# Patient Record
Sex: FEMALE | Race: WHITE | NOT HISPANIC OR LATINO | URBAN - METROPOLITAN AREA
[De-identification: names, ages, dates, MRNs, and addresses within clinical notes are randomized per-mention and may not be internally consistent; named-entity substitution may affect disease eponyms.]

---

## 2023-11-12 ENCOUNTER — INPATIENT (INPATIENT)
Facility: HOSPITAL | Age: 78
LOS: 2 days | Discharge: HOME | DRG: 516 | End: 2023-11-15
Attending: INTERNAL MEDICINE | Admitting: INTERNAL MEDICINE
Payer: MEDICARE

## 2023-11-12 VITALS
OXYGEN SATURATION: 95 % | DIASTOLIC BLOOD PRESSURE: 75 MMHG | WEIGHT: 175.05 LBS | HEART RATE: 87 BPM | SYSTOLIC BLOOD PRESSURE: 111 MMHG | RESPIRATION RATE: 18 BRPM | HEIGHT: 61 IN | TEMPERATURE: 98 F

## 2023-11-12 DIAGNOSIS — E11.9 TYPE 2 DIABETES MELLITUS WITHOUT COMPLICATIONS: ICD-10-CM

## 2023-11-12 DIAGNOSIS — I10 ESSENTIAL (PRIMARY) HYPERTENSION: ICD-10-CM

## 2023-11-12 DIAGNOSIS — I48.91 UNSPECIFIED ATRIAL FIBRILLATION: ICD-10-CM

## 2023-11-12 DIAGNOSIS — M31.6 OTHER GIANT CELL ARTERITIS: ICD-10-CM

## 2023-11-12 DIAGNOSIS — H53.9 UNSPECIFIED VISUAL DISTURBANCE: ICD-10-CM

## 2023-11-12 LAB
ALBUMIN SERPL ELPH-MCNC: 3.5 G/DL — SIGNIFICANT CHANGE UP (ref 3.3–5)
ALBUMIN SERPL ELPH-MCNC: 3.5 G/DL — SIGNIFICANT CHANGE UP (ref 3.3–5)
ALP SERPL-CCNC: 96 U/L — SIGNIFICANT CHANGE UP (ref 40–120)
ALP SERPL-CCNC: 96 U/L — SIGNIFICANT CHANGE UP (ref 40–120)
ALT FLD-CCNC: 11 U/L — SIGNIFICANT CHANGE UP (ref 10–45)
ALT FLD-CCNC: 11 U/L — SIGNIFICANT CHANGE UP (ref 10–45)
ANION GAP SERPL CALC-SCNC: 11 MMOL/L — SIGNIFICANT CHANGE UP (ref 5–17)
ANION GAP SERPL CALC-SCNC: 11 MMOL/L — SIGNIFICANT CHANGE UP (ref 5–17)
APTT BLD: 30.3 SEC — SIGNIFICANT CHANGE UP (ref 24.5–35.6)
APTT BLD: 30.3 SEC — SIGNIFICANT CHANGE UP (ref 24.5–35.6)
AST SERPL-CCNC: 12 U/L — SIGNIFICANT CHANGE UP (ref 10–40)
AST SERPL-CCNC: 12 U/L — SIGNIFICANT CHANGE UP (ref 10–40)
BILIRUB SERPL-MCNC: 0.2 MG/DL — SIGNIFICANT CHANGE UP (ref 0.2–1.2)
BILIRUB SERPL-MCNC: 0.2 MG/DL — SIGNIFICANT CHANGE UP (ref 0.2–1.2)
BUN SERPL-MCNC: 19 MG/DL — SIGNIFICANT CHANGE UP (ref 7–23)
BUN SERPL-MCNC: 19 MG/DL — SIGNIFICANT CHANGE UP (ref 7–23)
CALCIUM SERPL-MCNC: 9 MG/DL — SIGNIFICANT CHANGE UP (ref 8.4–10.5)
CALCIUM SERPL-MCNC: 9 MG/DL — SIGNIFICANT CHANGE UP (ref 8.4–10.5)
CHLORIDE SERPL-SCNC: 104 MMOL/L — SIGNIFICANT CHANGE UP (ref 96–108)
CHLORIDE SERPL-SCNC: 104 MMOL/L — SIGNIFICANT CHANGE UP (ref 96–108)
CO2 SERPL-SCNC: 24 MMOL/L — SIGNIFICANT CHANGE UP (ref 22–31)
CO2 SERPL-SCNC: 24 MMOL/L — SIGNIFICANT CHANGE UP (ref 22–31)
CREAT SERPL-MCNC: 0.74 MG/DL — SIGNIFICANT CHANGE UP (ref 0.5–1.3)
CREAT SERPL-MCNC: 0.74 MG/DL — SIGNIFICANT CHANGE UP (ref 0.5–1.3)
CRP SERPL-MCNC: 23 MG/L — HIGH (ref 0–4)
CRP SERPL-MCNC: 23 MG/L — HIGH (ref 0–4)
EGFR: 84 ML/MIN/1.73M2 — SIGNIFICANT CHANGE UP
EGFR: 84 ML/MIN/1.73M2 — SIGNIFICANT CHANGE UP
ERYTHROCYTE [SEDIMENTATION RATE] IN BLOOD: 76 MM/HR — HIGH (ref 0–20)
ERYTHROCYTE [SEDIMENTATION RATE] IN BLOOD: 76 MM/HR — HIGH (ref 0–20)
GLUCOSE BLDC GLUCOMTR-MCNC: 317 MG/DL — HIGH (ref 70–99)
GLUCOSE BLDC GLUCOMTR-MCNC: 317 MG/DL — HIGH (ref 70–99)
GLUCOSE BLDC GLUCOMTR-MCNC: 427 MG/DL — HIGH (ref 70–99)
GLUCOSE BLDC GLUCOMTR-MCNC: 427 MG/DL — HIGH (ref 70–99)
GLUCOSE BLDC GLUCOMTR-MCNC: 445 MG/DL — HIGH (ref 70–99)
GLUCOSE BLDC GLUCOMTR-MCNC: 445 MG/DL — HIGH (ref 70–99)
GLUCOSE BLDC GLUCOMTR-MCNC: 458 MG/DL — CRITICAL HIGH (ref 70–99)
GLUCOSE BLDC GLUCOMTR-MCNC: 458 MG/DL — CRITICAL HIGH (ref 70–99)
GLUCOSE SERPL-MCNC: 312 MG/DL — HIGH (ref 70–99)
GLUCOSE SERPL-MCNC: 312 MG/DL — HIGH (ref 70–99)
HCT VFR BLD CALC: 37 % — SIGNIFICANT CHANGE UP (ref 34.5–45)
HCT VFR BLD CALC: 37 % — SIGNIFICANT CHANGE UP (ref 34.5–45)
HGB BLD-MCNC: 12.1 G/DL — SIGNIFICANT CHANGE UP (ref 11.5–15.5)
HGB BLD-MCNC: 12.1 G/DL — SIGNIFICANT CHANGE UP (ref 11.5–15.5)
INR BLD: 1.19 RATIO — HIGH (ref 0.85–1.18)
INR BLD: 1.19 RATIO — HIGH (ref 0.85–1.18)
MCHC RBC-ENTMCNC: 28.1 PG — SIGNIFICANT CHANGE UP (ref 27–34)
MCHC RBC-ENTMCNC: 28.1 PG — SIGNIFICANT CHANGE UP (ref 27–34)
MCHC RBC-ENTMCNC: 32.7 GM/DL — SIGNIFICANT CHANGE UP (ref 32–36)
MCHC RBC-ENTMCNC: 32.7 GM/DL — SIGNIFICANT CHANGE UP (ref 32–36)
MCV RBC AUTO: 86 FL — SIGNIFICANT CHANGE UP (ref 80–100)
MCV RBC AUTO: 86 FL — SIGNIFICANT CHANGE UP (ref 80–100)
NRBC # BLD: 0 /100 WBCS — SIGNIFICANT CHANGE UP (ref 0–0)
NRBC # BLD: 0 /100 WBCS — SIGNIFICANT CHANGE UP (ref 0–0)
PLATELET # BLD AUTO: 309 K/UL — SIGNIFICANT CHANGE UP (ref 150–400)
PLATELET # BLD AUTO: 309 K/UL — SIGNIFICANT CHANGE UP (ref 150–400)
POTASSIUM SERPL-MCNC: 4.8 MMOL/L — SIGNIFICANT CHANGE UP (ref 3.5–5.3)
POTASSIUM SERPL-MCNC: 4.8 MMOL/L — SIGNIFICANT CHANGE UP (ref 3.5–5.3)
POTASSIUM SERPL-SCNC: 4.8 MMOL/L — SIGNIFICANT CHANGE UP (ref 3.5–5.3)
POTASSIUM SERPL-SCNC: 4.8 MMOL/L — SIGNIFICANT CHANGE UP (ref 3.5–5.3)
PROT SERPL-MCNC: 6.9 G/DL — SIGNIFICANT CHANGE UP (ref 6–8.3)
PROT SERPL-MCNC: 6.9 G/DL — SIGNIFICANT CHANGE UP (ref 6–8.3)
PROTHROM AB SERPL-ACNC: 13 SEC — SIGNIFICANT CHANGE UP (ref 9.5–13)
PROTHROM AB SERPL-ACNC: 13 SEC — SIGNIFICANT CHANGE UP (ref 9.5–13)
RBC # BLD: 4.3 M/UL — SIGNIFICANT CHANGE UP (ref 3.8–5.2)
RBC # BLD: 4.3 M/UL — SIGNIFICANT CHANGE UP (ref 3.8–5.2)
RBC # FLD: 12.6 % — SIGNIFICANT CHANGE UP (ref 10.3–14.5)
RBC # FLD: 12.6 % — SIGNIFICANT CHANGE UP (ref 10.3–14.5)
SODIUM SERPL-SCNC: 139 MMOL/L — SIGNIFICANT CHANGE UP (ref 135–145)
SODIUM SERPL-SCNC: 139 MMOL/L — SIGNIFICANT CHANGE UP (ref 135–145)
WBC # BLD: 7.97 K/UL — SIGNIFICANT CHANGE UP (ref 3.8–10.5)
WBC # BLD: 7.97 K/UL — SIGNIFICANT CHANGE UP (ref 3.8–10.5)
WBC # FLD AUTO: 7.97 K/UL — SIGNIFICANT CHANGE UP (ref 3.8–10.5)
WBC # FLD AUTO: 7.97 K/UL — SIGNIFICANT CHANGE UP (ref 3.8–10.5)

## 2023-11-12 PROCEDURE — 99223 1ST HOSP IP/OBS HIGH 75: CPT

## 2023-11-12 PROCEDURE — 99223 1ST HOSP IP/OBS HIGH 75: CPT | Mod: GC

## 2023-11-12 PROCEDURE — 70496 CT ANGIOGRAPHY HEAD: CPT | Mod: 26,MA

## 2023-11-12 PROCEDURE — 99285 EMERGENCY DEPT VISIT HI MDM: CPT | Mod: GC

## 2023-11-12 PROCEDURE — 99221 1ST HOSP IP/OBS SF/LOW 40: CPT

## 2023-11-12 PROCEDURE — 70543 MRI ORBT/FAC/NCK W/O &W/DYE: CPT | Mod: 26

## 2023-11-12 PROCEDURE — 70553 MRI BRAIN STEM W/O & W/DYE: CPT | Mod: 26

## 2023-11-12 PROCEDURE — 70498 CT ANGIOGRAPHY NECK: CPT | Mod: 26,MA

## 2023-11-12 RX ORDER — INFLUENZA VIRUS VACCINE 15; 15; 15; 15 UG/.5ML; UG/.5ML; UG/.5ML; UG/.5ML
0.7 SUSPENSION INTRAMUSCULAR ONCE
Refills: 0 | Status: DISCONTINUED | OUTPATIENT
Start: 2023-11-12 | End: 2023-11-15

## 2023-11-12 RX ORDER — RAMIPRIL 5 MG
1 CAPSULE ORAL
Refills: 0 | DISCHARGE

## 2023-11-12 RX ORDER — PANTOPRAZOLE SODIUM 20 MG/1
40 TABLET, DELAYED RELEASE ORAL
Refills: 0 | Status: DISCONTINUED | OUTPATIENT
Start: 2023-11-12 | End: 2023-11-14

## 2023-11-12 RX ORDER — LEVOTHYROXINE SODIUM 125 MCG
1 TABLET ORAL
Refills: 0 | DISCHARGE

## 2023-11-12 RX ORDER — SODIUM CHLORIDE 9 MG/ML
1000 INJECTION, SOLUTION INTRAVENOUS
Refills: 0 | Status: DISCONTINUED | OUTPATIENT
Start: 2023-11-12 | End: 2023-11-14

## 2023-11-12 RX ORDER — ENOXAPARIN SODIUM 100 MG/ML
40 INJECTION SUBCUTANEOUS EVERY 24 HOURS
Refills: 0 | Status: DISCONTINUED | OUTPATIENT
Start: 2023-11-12 | End: 2023-11-12

## 2023-11-12 RX ORDER — DEXTROSE 50 % IN WATER 50 %
12.5 SYRINGE (ML) INTRAVENOUS ONCE
Refills: 0 | Status: DISCONTINUED | OUTPATIENT
Start: 2023-11-12 | End: 2023-11-14

## 2023-11-12 RX ORDER — METOPROLOL TARTRATE 50 MG
1 TABLET ORAL
Refills: 0 | DISCHARGE

## 2023-11-12 RX ORDER — INSULIN LISPRO 100/ML
8 VIAL (ML) SUBCUTANEOUS
Refills: 0 | Status: DISCONTINUED | OUTPATIENT
Start: 2023-11-12 | End: 2023-11-14

## 2023-11-12 RX ORDER — METOPROLOL TARTRATE 50 MG
100 TABLET ORAL
Refills: 0 | Status: DISCONTINUED | OUTPATIENT
Start: 2023-11-12 | End: 2023-11-14

## 2023-11-12 RX ORDER — GLUCAGON INJECTION, SOLUTION 0.5 MG/.1ML
1 INJECTION, SOLUTION SUBCUTANEOUS ONCE
Refills: 0 | Status: DISCONTINUED | OUTPATIENT
Start: 2023-11-12 | End: 2023-11-14

## 2023-11-12 RX ORDER — APIXABAN 2.5 MG/1
1 TABLET, FILM COATED ORAL
Refills: 0 | DISCHARGE

## 2023-11-12 RX ORDER — FERROUS SULFATE 325(65) MG
1 TABLET ORAL
Refills: 0 | DISCHARGE

## 2023-11-12 RX ORDER — INSULIN LISPRO 100/ML
VIAL (ML) SUBCUTANEOUS
Refills: 0 | Status: DISCONTINUED | OUTPATIENT
Start: 2023-11-12 | End: 2023-11-14

## 2023-11-12 RX ORDER — LISINOPRIL 2.5 MG/1
20 TABLET ORAL DAILY
Refills: 0 | Status: DISCONTINUED | OUTPATIENT
Start: 2023-11-12 | End: 2023-11-14

## 2023-11-12 RX ORDER — INSULIN GLARGINE 100 [IU]/ML
15 INJECTION, SOLUTION SUBCUTANEOUS AT BEDTIME
Refills: 0 | Status: DISCONTINUED | OUTPATIENT
Start: 2023-11-12 | End: 2023-11-12

## 2023-11-12 RX ORDER — DEXTROSE 50 % IN WATER 50 %
25 SYRINGE (ML) INTRAVENOUS ONCE
Refills: 0 | Status: DISCONTINUED | OUTPATIENT
Start: 2023-11-12 | End: 2023-11-14

## 2023-11-12 RX ORDER — INSULIN GLARGINE 100 [IU]/ML
25 INJECTION, SOLUTION SUBCUTANEOUS AT BEDTIME
Refills: 0 | Status: DISCONTINUED | OUTPATIENT
Start: 2023-11-12 | End: 2023-11-14

## 2023-11-12 RX ORDER — ENOXAPARIN SODIUM 100 MG/ML
80 INJECTION SUBCUTANEOUS
Refills: 0 | Status: DISCONTINUED | OUTPATIENT
Start: 2023-11-12 | End: 2023-11-12

## 2023-11-12 RX ORDER — DEXTROSE 50 % IN WATER 50 %
15 SYRINGE (ML) INTRAVENOUS ONCE
Refills: 0 | Status: DISCONTINUED | OUTPATIENT
Start: 2023-11-12 | End: 2023-11-14

## 2023-11-12 RX ORDER — LEVOTHYROXINE SODIUM 125 MCG
100 TABLET ORAL DAILY
Refills: 0 | Status: DISCONTINUED | OUTPATIENT
Start: 2023-11-12 | End: 2023-11-14

## 2023-11-12 RX ORDER — ATORVASTATIN CALCIUM 80 MG/1
1 TABLET, FILM COATED ORAL
Refills: 0 | DISCHARGE

## 2023-11-12 RX ORDER — ATORVASTATIN CALCIUM 80 MG/1
10 TABLET, FILM COATED ORAL AT BEDTIME
Refills: 0 | Status: DISCONTINUED | OUTPATIENT
Start: 2023-11-12 | End: 2023-11-14

## 2023-11-12 RX ORDER — INSULIN LISPRO 100/ML
VIAL (ML) SUBCUTANEOUS AT BEDTIME
Refills: 0 | Status: DISCONTINUED | OUTPATIENT
Start: 2023-11-12 | End: 2023-11-14

## 2023-11-12 RX ORDER — INSULIN LISPRO 100/ML
5 VIAL (ML) SUBCUTANEOUS
Refills: 0 | Status: DISCONTINUED | OUTPATIENT
Start: 2023-11-12 | End: 2023-11-12

## 2023-11-12 RX ORDER — (INSULIN DEGLUDEC AND LIRAGLUTIDE) 100; 3.6 [IU]/ML; MG/ML
14 INJECTION, SOLUTION SUBCUTANEOUS
Refills: 0 | DISCHARGE

## 2023-11-12 RX ADMIN — Medication 2: at 22:47

## 2023-11-12 RX ADMIN — ATORVASTATIN CALCIUM 10 MILLIGRAM(S): 80 TABLET, FILM COATED ORAL at 21:19

## 2023-11-12 RX ADMIN — Medication 8 UNIT(S): at 18:21

## 2023-11-12 RX ADMIN — PANTOPRAZOLE SODIUM 40 MILLIGRAM(S): 20 TABLET, DELAYED RELEASE ORAL at 16:58

## 2023-11-12 RX ADMIN — Medication 1000 MILLIGRAM(S): at 08:01

## 2023-11-12 RX ADMIN — LISINOPRIL 20 MILLIGRAM(S): 2.5 TABLET ORAL at 18:18

## 2023-11-12 RX ADMIN — ENOXAPARIN SODIUM 40 MILLIGRAM(S): 100 INJECTION SUBCUTANEOUS at 16:58

## 2023-11-12 RX ADMIN — INSULIN GLARGINE 25 UNIT(S): 100 INJECTION, SOLUTION SUBCUTANEOUS at 22:47

## 2023-11-12 RX ADMIN — Medication 100 MILLIGRAM(S): at 18:18

## 2023-11-12 RX ADMIN — Medication 6: at 18:18

## 2023-11-12 RX ADMIN — Medication 50 MILLIGRAM(S): at 07:01

## 2023-11-12 RX ADMIN — Medication 100 MICROGRAM(S): at 18:18

## 2023-11-12 NOTE — H&P ADULT - ASSESSMENT
79yo woman with a PMHx of T2DM, HTN, HLD, hypothyroidism, AFib and bilateral cataract surgery presents to [place] ER after one episode of transient right eye vision loss, 3 weeks of episodic headaches, found to have elevated ESR and CRP, transferred to [facility] to be seen by optho and neuro, given 1 gm IV solumedrol in ED due to concern for GCA, admitted for further management and rheum eval.    AMAUROSIS FUGAX  HTN  HLD  R/O GCA  Afib  Hyperglycemia in a setting of IV steroids and known DM  Transient vision loss of right eye, appearing to have a curtain like grey haze lasting about 10-15 minutes, then spontaneously resolved per patient. There is a  concern for amaurosis fugax  possibly 2/2 GCA as patient found to have elevated ESR/CRP, endorsed temporal tenderness, headaches, and jaw pain. Will also need to evaluate for potential vascular etiology. Less likely 2/2 intraorbital pathology. Patient denies any visual deficit, headache, or temporal/ scalp tenderness at present.      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 R superficial TA  seem by be inflamed as well  - s/p IV 1g solumedrol given in ED. Plan for at least 3 days of pulse dose steroids  -  rheumatology and vascular called  - place on insulin on a SS, Lantus and premeal admelog. Endo called  - DVT ppx w sc Lovenox. GI ppx w PPI         77yo woman with a PMHx of T2DM, HTN, HLD, hypothyroidism, AFib on ELiquis, last dose in am of 11/11  and bilateral cataract surgery presents to [place] ER after one episode of transient right eye vision loss, 3 weeks of episodic headaches, found to have elevated ESR and CRP, transferred to [facility] to be seen by optho and neuro, given 1 gm IV solumedrol in ED due to concern for GCA, admitted for further management and rheum eval.    AMAUROSIS FUGAX  HTN  HLD  R/O GCA  Afib  Hyperglycemia in a setting of IV steroids and known DM  Transient vision loss of right eye, appearing to have a curtain like grey haze lasting about 10-15 minutes, then spontaneously resolved per patient. There is a  concern for amaurosis fugax  possibly 2/2 GCA as patient found to have elevated ESR/CRP, endorsed temporal tenderness, headaches, and jaw pain. Will also need to evaluate for potential vascular etiology. Less likely 2/2 intraorbital pathology. Patient denies any visual deficit, headache, or temporal/ scalp tenderness at present.      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 R superficial TA  seem by be inflamed as well  - s/p IV 1g solumedrol given in ED. Plan for at least 3 days of pulse dose steroids  -  rheumatology and vascular called  - place on insulin on a SS, Lantus and premeal admelog. Endo called  - Afib. change AC to Lovenox 80 mg q12H in prep for TA Biopsy  - DVT ppx not necessary. ptn is on full AC. GI ppx w PPI

## 2023-11-12 NOTE — CONSULT NOTE ADULT - SUBJECTIVE AND OBJECTIVE BOX
HPI:  79yo woman with a PMHx of T2DM, HTN, HLD, hypothyroidism, AFib on ELiquis, last dose in am of 11/11 and bilateral cataract surgery presents with 1 episode of transient vision loss and 3 weeks of episodic headaches.   Pt reported on 11/11 at around 2PM to [facility] ER after she noticed her right eye vision was decreased as if a gray film was over her vision for 10-15 minutes.   Denied pain, flashes, curtain over vision or diplopia when assessed by Ophthalmology. Reports this episode resolved without intervention and that vision is now back to baseline. Denies head or eye trauma. Denies prior similar episodes. Also reports she woke up 11/11 with lightheadedness and fatigue. Reports chronic floaters that are unchanged. Pt also reports she has had 3 weeks of episodic headaches that are generalized however with prominence in bilateral temporal areas, reports that she feels whole scalp tenderness when the headache occurs, also has tenderness when palpating bilateral temporal areas. She denied jaw claudication, fevers, chills, or significant joint pains.   At  [facility] ESR was found to be 50, pt received 60mg IV solumedrol. CTH noncon was wnl. Pt then transferred to [facility] for ophthalmology evaluation. on 11/12 received 1g IV solumedrol. CRP 23.  Patient reports that her vision is at baseline now, denies any visual deficit, headache, or temporal/ scalp tenderness at present.  This morning patient reports that her symptoms may be dental related and does endorse jaw pain. Denies any joint pain.   Seen by Neuro and optho in the ED, rheum called (12 Nov 2023 15:10)  Patient has history of diabetes, denies any polyuria polydipsia, no recent hypoglycemic episodes. Patient follows up with PCP for health diabetes management.    PAST MEDICAL & SURGICAL HISTORY:  Hypertension      Hyperlipidemia      Atrial fibrillation      Diabetes      No significant past surgical history          FAMILY HISTORY:  No pertinent family history in first degree relatives        Social History:    Outpatient Medications:    MEDICATIONS  (STANDING):  atorvastatin 10 milliGRAM(s) Oral at bedtime  dextrose 5%. 1000 milliLiter(s) (100 mL/Hr) IV Continuous <Continuous>  dextrose 5%. 1000 milliLiter(s) (50 mL/Hr) IV Continuous <Continuous>  dextrose 50% Injectable 25 Gram(s) IV Push once  dextrose 50% Injectable 12.5 Gram(s) IV Push once  dextrose 50% Injectable 25 Gram(s) IV Push once  glucagon  Injectable 1 milliGRAM(s) IntraMuscular once  insulin glargine Injectable (LANTUS) 25 Unit(s) SubCutaneous at bedtime  insulin lispro (ADMELOG) corrective regimen sliding scale   SubCutaneous three times a day before meals  insulin lispro Injectable (ADMELOG) 8 Unit(s) SubCutaneous three times a day before meals  levothyroxine 100 MICROGram(s) Oral daily  lisinopril 20 milliGRAM(s) Oral daily  metoprolol tartrate 100 milliGRAM(s) Oral two times a day  pantoprazole    Tablet 40 milliGRAM(s) Oral before breakfast    MEDICATIONS  (PRN):  dextrose Oral Gel 15 Gram(s) Oral once PRN Blood Glucose LESS THAN 70 milliGRAM(s)/deciliter      Allergies    No Known Allergies    Intolerances      Review of Systems:  UNABLE TO OBTAIN  Cardiovascular: No chest pain, no palpitations  Respiratory: No wheezing, no cough  GI: No nausea, no vomiting  : No dysuria        ALL OTHER SYSTEMS REVIEWED AND NEGATIVE    PHYSICAL EXAM:  VITALS: T(C): 36.8 (11-12-23 @ 14:52)  T(F): 98.2 (11-12-23 @ 14:52), Max: 98.4 (11-12-23 @ 11:08)  HR: 80 (11-12-23 @ 14:52) (80 - 88)  BP: 109/69 (11-12-23 @ 14:52) (109/69 - 123/73)  RR:  (16 - 18)  SpO2:  (95% - 98%)  Wt(kg): --  THYROID: Normal size, no palpable nodules  RESPIRATORY: Clear to auscultation bilaterally.  CARDIOVASCULAR: Si S2, No murmurs;  GI: Soft, non distended.      POCT Blood Glucose.: 427 mg/dL (11-12-23 @ 18:16)                            12.1   7.97  )-----------( 309      ( 12 Nov 2023 06:26 )             37.0       11-12    139  |  104  |  19  ----------------------------<  312<H>  4.8   |  24  |  0.74    eGFR: 84    Ca    9.0      11-12    TPro  6.9  /  Alb  3.5  /  TBili  0.2  /  DBili  x   /  AST  12  /  ALT  11  /  AlkPhos  96  11-12      Thyroid Function Tests:          Radiology:

## 2023-11-12 NOTE — ED PROVIDER NOTE - PROGRESS NOTE DETAILS
Tyron CORONA, PGY-1;    Optho consulted, informed patient is in ED and ready for evaluation. Tyron CORONA, PGY-1;    Esmeho recommends neurology consult and stroke workup. Will order and f/u CTA head/neck. Additionally, repeating CRP/ESR/CBC. Dispo to medicine following imagining for further rheum workup and need for potential temporal artery biopsy.    Neurology consulted, will see patient in ED. Vaishali, PGY3: Patient signed out to my care. pending CTA and neurology consult.  Will most likely admit.

## 2023-11-12 NOTE — ED ADULT NURSE REASSESSMENT NOTE - NS ED NURSE REASSESS COMMENT FT1
Solumedrol IVPB not available in [facility] [place] [facility]. Spoke with pharmacist who states medication will be sent via tube. Awaiting medication arrival.

## 2023-11-12 NOTE — CONSULT NOTE ADULT - SUBJECTIVE AND OBJECTIVE BOX
VASCULAR SURGERY CONSULT NOTE  --------------------------------------------------------------------------------------------    Patient is a 78y old  Female who presents with a chief complaint of headache    HPI:  79yo woman with a PMHx of T2DM, HTN, HLD, hypothyroidism, AFib on ELiquis, last dose in am of 11/11 and bilateral cataract surgery presents with 1 episode of transient vision loss and 3 weeks of episodic headaches.   Pt reported on 11/11 at around 2PM to [facility] ER after she noticed her right eye vision was decreased as if a gray film was over her vision for 10-15 minutes.   Denied pain, flashes, curtain over vision or diplopia when assessed by Ophthalmology. Reports this episode resolved without intervention and that vision is now back to baseline. Denies head or eye trauma. Denies prior similar episodes. Also reports she woke up 11/11 with lightheadedness and fatigue. Reports chronic floaters that are unchanged. Pt also reports she has had 3 weeks of episodic headaches that are generalized however with prominence in bilateral temporal areas, reports that she feels whole scalp tenderness when the headache occurs, also has tenderness when palpating bilateral temporal areas. She denied jaw claudication, fevers, chills, or significant joint pains.     At  [facility] ESR was found to be 50, pt received 60mg IV solumedrol. CTH noncon was wnl. Pt then transferred to [facility] for ophthalmology evaluation. on 11/12 received 1g IV solumedrol. CRP 23.  Patient reports that her vision is at baseline now, denies any visual deficit, headache, or temporal/ scalp tenderness at present.  This morning patient reports that her symptoms may be dental related and does endorse jaw pain. Denies any joint pain.   Seen by Neuro and optho in the ED, rheum called (12 Nov 2023 15:10)      ROS: 10-system review is otherwise negative except HPI above.      PAST MEDICAL & SURGICAL HISTORY:  Hypertension      Hyperlipidemia      Atrial fibrillation      Diabetes      No significant past surgical history        FAMILY HISTORY:  No pertinent family history in first degree relatives        SOCIAL HISTORY:      ALLERGIES: No Known Allergies      HOME MEDICATIONS:     CURRENT MEDICATIONS  MEDICATIONS (STANDING): atorvastatin 10 milliGRAM(s) Oral at bedtime  dextrose 5%. 1000 milliLiter(s) IV Continuous <Continuous>  dextrose 5%. 1000 milliLiter(s) IV Continuous <Continuous>  dextrose 50% Injectable 25 Gram(s) IV Push once  dextrose 50% Injectable 12.5 Gram(s) IV Push once  dextrose 50% Injectable 25 Gram(s) IV Push once  enoxaparin Injectable 80 milliGRAM(s) SubCutaneous <User Schedule>  glucagon  Injectable 1 milliGRAM(s) IntraMuscular once  insulin glargine Injectable (LANTUS) 25 Unit(s) SubCutaneous at bedtime  insulin lispro (ADMELOG) corrective regimen sliding scale   SubCutaneous three times a day before meals  insulin lispro Injectable (ADMELOG) 8 Unit(s) SubCutaneous three times a day before meals  levothyroxine 100 MICROGram(s) Oral daily  lisinopril 20 milliGRAM(s) Oral daily  metoprolol tartrate 100 milliGRAM(s) Oral two times a day  pantoprazole    Tablet 40 milliGRAM(s) Oral before breakfast    MEDICATIONS (PRN):dextrose Oral Gel 15 Gram(s) Oral once PRN Blood Glucose LESS THAN 70 milliGRAM(s)/deciliter    --------------------------------------------------------------------------------------------    Vitals:   T(C): 36.8 (11-12-23 @ 14:52), Max: 36.9 (11-12-23 @ 11:08)  HR: 80 (11-12-23 @ 14:52) (80 - 88)  BP: 109/69 (11-12-23 @ 14:52) (109/69 - 123/73)  RR: 16 (11-12-23 @ 14:52) (16 - 18)  SpO2: 98% (11-12-23 @ 14:52) (95% - 98%)  CAPILLARY BLOOD GLUCOSE        CAPILLARY BLOOD GLUCOSE          Height (cm): 154.9 (11-12 @ 03:52)  Weight (kg): 79.4 (11-12 @ 03:52)  BMI (kg/m2): 33.1 (11-12 @ 03:52)  BSA (m2): 1.78 (11-12 @ 03:52)    PHYSICAL EXAM:   General: NAD, Lying in bed comfortably  Neuro: A+Ox3  HEENT: NC/AT, EOMI  Neck: Soft, supple  Cardio: RRR, nml S1/S2  Resp: Good effort, CTA b/l  GI/Abd: Soft, NT/ND, no rebound/guarding, no masses palpated  Vascular:   Skin: Intact, no breakdown  Lymphatic/Nodes: No palpable lymphadenopathy  Musculoskeletal: All 4 extremities moving spontaneously, no limitations.  --------------------------------------------------------------------------------------------    LABS  CBC (11-12 @ 06:26)                              12.1                           7.97    )----------------(  309        --    % Neutrophils, --    % Lymphocytes, ANC: --                                  37.0      BMP (11-12 @ 06:26)             139     |  104     |  19    		Ca++ --      Ca 9.0                ---------------------------------( 312<H>		Mg --                 4.8     |  24      |  0.74  			Ph --        LFTs (11-12 @ 06:26)      TPro 6.9 / Alb 3.5 / TBili 0.2 / DBili -- / AST 12 / ALT 11 / AlkPhos 96    Coags (11-12 @ 06:33)  aPTT 30.3 / INR 1.19<H> / PT 13.0          --------------------------------------------------------------------------------------------    MICROBIOLOGY  Urinalysis (11-12 @ 06:26):     Color:  / Appearance:  / SG:  / pH:  / Gluc: 312<H> / Ketones:  / Bili:  / Urobili:  / Protein : / Nitrites:  / Leuk.Est:  / RBC:  / WBC:  / Sq Epi:  / Non Sq Epi:  / Bacteria          --------------------------------------------------------------------------------------------    IMAGING

## 2023-11-12 NOTE — ED ADULT NURSE NOTE - OBJECTIVE STATEMENT
PT is a 78 year old A&OX4 female with PMH of DM (PT states she takes other medication but cannot recall the names or what for and transfer documentation does not say). PT arrives to the ED via EMS as a transfer from [facility] with c/o vision loss. Per EMS, PT began experiencing intermittent right-sided head pain and experienced 1 episode of vision loss that lasted ~30 minutes and has resolved. PT was transferred to [facility] for opthalmology consult. PT received 60 mg Solumedrol and 1L of normal saline PTA. PT currently denies all complaints, PT denies pain, chest pain, SOB, N/V/D, dizziness, and problems with her vision. PT is resting comfortably in bed, breathing unlabored on room air, and speaking in complete sentences. Abdomen is soft, non-tender, and non-distended. Skin is warm and dry, no diaphoresis noted. No edema noted to B/L extremities. Strong strength in B/L extremities, sensation intact. IV access established 22G in right wrist by [place]. PT arrived in hospital gown. Safety and comfort maintained.

## 2023-11-12 NOTE — ED PROVIDER NOTE - PHYSICAL EXAMINATION
Physical Exam:  Gen: NAD, AOx3, non-toxic appearing, able to ambulate without assistance  Head: NCAT  HEENT: EOMI, PEERLA, normal conjunctiva, tongue midline, oral mucosa moist  Lung: CTAB, no respiratory distress, no wheezes/rhonchi/rales B/L, speaking in full sentences  CV: RRR, no murmurs, rubs or gallops  Abd: soft, NT, ND, no guarding, no rigidity, no rebound tenderness, no CVA tenderness   MSK: no visible deformities, ROM normal in UE/LE, no back pain  Neuro: No focal sensory or motor deficits, CN II-XII intact   Skin: Warm, well perfused, no rash, no leg swelling  Psych: normal affect, calm

## 2023-11-12 NOTE — H&P ADULT - NSHPPHYSICALEXAM_GEN_ALL_CORE
T(C): 36.8 (11-12-23 @ 14:52), Max: 36.9 (11-12-23 @ 11:08)  HR: 80 (11-12-23 @ 14:52) (80 - 88)  BP: 109/69 (11-12-23 @ 14:52) (109/69 - 123/73)  RR: 16 (11-12-23 @ 14:52) (16 - 18)  SpO2: 98% (11-12-23 @ 14:52) (95% - 98%)    PHYSICAL EXAM:  GENERAL: NAD, well-developed  HEAD:  Atraumatic, Normocephalic  EYES: EOMI, PERRLA, conjunctiva and sclera clear  NECK: Supple, No JVD  CHEST/LUNG: Clear to auscultation bilaterally; No wheeze  HEART: Regular rate and rhythm; No murmurs, rubs, or gallops  ABDOMEN: Soft, Nontender, Nondistended; Bowel sounds present  EXTREMITIES:  2+ Peripheral Pulses, No clubbing, cyanosis, or edema  PSYCH: AAOx3  NEUROLOGY: non-focal  SKIN: No rashes or lesions

## 2023-11-12 NOTE — ED PROVIDER NOTE - OBJECTIVE STATEMENT
78-year-old female past medical history A-fib on eiliquis, hypertension, hyperlipidemia, diabetes presents as transfer from [facility] for ophthalmology evaluation.  Patient complains of headache right temporal region for the last 3 weeks as well as dizziness.  She had a brief episode of a 30-minute partial vision loss on the right side, lasted between 130 to 2 PM.  Patient was evaluated in the ED 5 days ago CT scan performed which did not show signs of intracranial bleed at that time.  She describes today's vision loss is occurred in coming out from the right side of her eye.  On arrival to [facility] patient does not complain of vision loss or headache.  She denies chest pain and shortness of breath.  Labs at [place] indicate ESR elevated.  She was given IV steroids and transferred to [facility]. 78-year-old female past medical history A-fib on eiliquis, hypertension, hyperlipidemia, diabetes presents as transfer from [facility] for ophthalmology evaluation.  Patient complains of headache right temporal region for the last 3 weeks as well as dizziness.  She had a brief episode of a 30-minute partial vision loss on the right side, lasted between 130 to 2 PM.  Patient was evaluated in the ED 5 days ago CT scan performed which did not show signs of intracranial bleed at that time.  She describes today's vision loss as grey film covering her right eye.  On arrival to [facility] patient does not complain of vision loss or headache.  She denies chest pain and shortness of breath.  Labs at [place] indicate ESR elevated.  She was given IV steroids and transferred to [facility].

## 2023-11-12 NOTE — CONSULT NOTE ADULT - ASSESSMENT
77yo woman with a PMHx of T2DM, HTN, HLD, hypothyroidism, AFib on ELiquis, last dose in am of 11/11 and bilateral cataract surgery presents with 1 episode of transient vision loss and 3 weeks of episodic headaches. Vascular surgery is consulted to rule out temporal arteritis.     Recommendations:  - OR scheduled for tomorrow: right temporal artery biopsy  - Consent in chart  - AM labs at 4:00 AM (CBC/BMP/PT/PTT/INR)  - Document medical and cardiac clearance  - Pain management PRN  - Rest of care per primary team    Vascular Surgery  [id number] 77yo woman with a PMHx of T2DM, HTN, HLD, hypothyroidism, AFib on ELiquis, last dose in am of 11/11 and bilateral cataract surgery presents with 1 episode of transient vision loss and 3 weeks of episodic headaches. Vascular surgery is consulted to rule out temporal arteritis.     Recommendations:  - OR scheduled for tomorrow: right temporal artery biopsy  - Consent in chart  - AM labs at 4:00 AM (CBC/BMP/PT/PTT/INR)  - Document medical and cardiac clearance  - Pain management PRN  - Stop Lovenox  - Rest of care per primary team    Vascular Surgery  [id number] 77yo woman with a PMHx of T2DM, HTN, HLD, hypothyroidism, AFib on ELiquis, last dose in am of 11/11 and bilateral cataract surgery presents with 1 episode of transient vision loss and 3 weeks of episodic headaches. Vascular surgery is consulted to rule out temporal arteritis.     Recommendations:  - OR scheduled for tomorrow: right temporal artery biopsy  indications risks and benefits of  rt ta bs d/w pt  who consents   - Consent in chart  - AM labs at 4:00 AM (CBC/BMP/PT/PTT/INR)  - Document medical and cardiac clearance  - Pain management PRN  - Stop Lovenox  - Rest of care per primary team    Vascular Surgery  [id number]

## 2023-11-12 NOTE — CONSULT NOTE ADULT - SUBJECTIVE AND OBJECTIVE BOX
[facility] DEPARTMENT OF OPHTHALMOLOGY - INITIAL ADULT CONSULT  ----------------------------------------------------------------------------------------------------  Jose Peralta PGY-2  Available on teams  ----------------------------------------------------------------------------------------------------    HPI: 78F with PMH T2DM, HTN, HLD, hypothyroidism, AFib and POHx bilateral cataract surgery presents with 1 episode of transient vision loss and 3 weeks of episodic headaches. Pt reports on 11/11 at around 2PM she noticed her right eye vision was decreased as if a gray film was over her vision for 10-15 minutes, denies pain, flashes, curtain over vision or diplopia. Reports this episode resolved without intervention and that vision is now back to baseline. Denies head or eye trauma. Denies prior similar episodes. Also reports she woke up 11/11 with lightheadedness and fatigue. Reports chronic floaters that are unchanged. Pt also reports she has had 3 weeks of episodic headaches that are generalized however with prominence in bilateral temporal areas, reports that she feels whole scalp tenderness when the headache occurs, also has tenderness when palpating bilateral temporal areas. Denies jaw claudication, fevers, chills, or significant joint pains.     Interval History: Pt initially reported to [facility] where ESR was found to be 50, pt received 60mg IV solumedrol. CTH noncon was wnl.     PAST MEDICAL & SURGICAL HISTORY:  Hypertension  Hyperlipidemia  Atrial fibrillation  Diabetes  No significant past surgical history      Past Ocular History: CE/IOL OU   Ophthalmic Medications:   FAMILY HISTORY:  No pertinent family history in first degree relatives      MEDICATIONS  (STANDING):    MEDICATIONS  (PRN):    Allergies & Intolerances:   No Known Allergies    Review of Systems:  Constitutional: No fever, chills  Eyes: See HPI   Neuro: No tremors  Cardiovascular: No chest pain, palpitations  Respiratory: No SOB, no cough  GI: No nausea, vomiting, abdominal pain  : No dysuria  Skin: no rash  Psych: no depression  Endocrine: no polyuria, polydipsia  Heme/lymph: no swelling    VITALS: T(C): 36.7 (11-12-23 @ 04:25)  T(F): 98 (11-12-23 @ 04:25), Max: 98.1 (11-12-23 @ 03:52)  HR: 85 (11-12-23 @ 04:25) (85 - 87)  BP: 123/73 (11-12-23 @ 04:25) (111/75 - 123/73)  RR:  (18 - 18)  SpO2:  (95% - 97%)  Wt(kg): --  General: AAO x 3, appropriate mood and affect    Ophthalmology Exam:  Visual acuity (cc): 20/25 OD ; 20/50 PH to 20/20 OS  Pupils: PERRL OU, nor APD  Ttono: 14 OU  Extraocular movements (EOMs): Full OU, no pain, no diplopia  Confrontational Visual Field (CVF): Full OU  Color Plates: 12/12 OU    Pen Light Exam (PLE)  External: Flat OU  Lids/Lashes/Lacrimal Ducts: Flat OU    Sclera/Conjunctiva: W+Q OU  Cornea: Cl OU  Anterior Chamber: D+F OU    Iris: Flat OU  Lens: Cl OU    Fundus Exam: dilated with 1% tropicamide and 2.5% phenylephrine  Approval obtained from primary team for dilation  Patient aware that pupils can remained dilated for at least 4-6 hours  Exam performed with 20D lens    Vitreous: wnl OU  Disc, cup/disc: sharp and pink, 0.4 OU ***   Macula: wnl OU  Vessels: wnl OU  Periphery: wnl OU   [facility] DEPARTMENT OF OPHTHALMOLOGY - INITIAL ADULT CONSULT  ----------------------------------------------------------------------------------------------------  Jose Peralta PGY-2  Available on teams  ----------------------------------------------------------------------------------------------------    HPI: 78F with PMH T2DM, HTN, HLD, hypothyroidism, AFib and POHx bilateral cataract surgery presents with 1 episode of transient vision loss and 3 weeks of episodic headaches. Pt reports on 11/11 at around 2PM she noticed her right eye vision was decreased as if a gray film was over her vision for 10-15 minutes, denies pain, flashes, curtain over vision or diplopia. Reports this episode resolved without intervention and that vision is now back to baseline. Denies head or eye trauma. Denies prior similar episodes. Also reports she woke up 11/11 with lightheadedness and fatigue. Reports chronic floaters that are unchanged. Pt also reports she has had 3 weeks of episodic headaches that are generalized however with prominence in bilateral temporal areas, reports that she feels whole scalp tenderness when the headache occurs, also has tenderness when palpating bilateral temporal areas. Denies jaw claudication, fevers, chills, or significant joint pains.     Interval History: Pt initially reported to [facility] where ESR was found to be 50, pt received 60mg IV solumedrol. CTH noncon was wnl.     PAST MEDICAL & SURGICAL HISTORY:  Hypertension  Hyperlipidemia  Atrial fibrillation  Diabetes  No significant past surgical history      Past Ocular History: CE/IOL OU   Ophthalmic Medications:   FAMILY HISTORY:  No pertinent family history in first degree relatives      MEDICATIONS  (STANDING):    MEDICATIONS  (PRN):    Allergies & Intolerances:   No Known Allergies    Review of Systems:  Constitutional: No fever, chills  Eyes: See HPI   Neuro: No tremors  Cardiovascular: No chest pain, palpitations  Respiratory: No SOB, no cough  GI: No nausea, vomiting, abdominal pain  : No dysuria  Skin: no rash  Psych: no depression  Endocrine: no polyuria, polydipsia  Heme/lymph: no swelling    VITALS: T(C): 36.7 (11-12-23 @ 04:25)  T(F): 98 (11-12-23 @ 04:25), Max: 98.1 (11-12-23 @ 03:52)  HR: 85 (11-12-23 @ 04:25) (85 - 87)  BP: 123/73 (11-12-23 @ 04:25) (111/75 - 123/73)  RR:  (18 - 18)  SpO2:  (95% - 97%)  Wt(kg): --  General: AAO x 3, appropriate mood and affect    Ophthalmology Exam:  Visual acuity (cc): 20/25 OD ; 20/50 PH to 20/20 OS  Pupils: PERRL OU, nor APD  Ttono: 14 OU  Extraocular movements (EOMs): Full OU, no pain, no diplopia  Confrontational Visual Field (CVF): Full OU  Color Plates: 12/12 OU    Pen Light Exam (PLE)  External: Flat OU  Lids/Lashes/Lacrimal Ducts: Flat OU    Sclera/Conjunctiva: W+Q OU  Cornea: Cl OU  Anterior Chamber: D+F OU    Iris: Flat OU  Lens: Cl OU    Fundus Exam: dilated with 1% tropicamide and 2.5% phenylephrine  Approval obtained from primary team for dilation  Patient aware that pupils can remained dilated for at least 4-6 hours  Exam performed with 20D lens    Vitreous: wnl OU  Disc, cup/disc: sharp and pink, 0.4 OU ***   Macula: wnl OU  Vessels: wnl OU  Periphery: wnl OU   [facility] DEPARTMENT OF OPHTHALMOLOGY - INITIAL ADULT CONSULT  ----------------------------------------------------------------------------------------------------  Jose Peralta PGY-2  Available on teams  ----------------------------------------------------------------------------------------------------    HPI: 78F with PMH T2DM, HTN, HLD, hypothyroidism, AFib and POHx bilateral cataract surgery presents with 1 episode of transient vision loss and 3 weeks of episodic headaches. Pt reports on 11/11 at around 2PM she noticed her right eye vision was decreased as if a gray film was over her vision for 10-15 minutes, denies pain, flashes, curtain over vision or diplopia. Reports this episode resolved without intervention and that vision is now back to baseline. Denies head or eye trauma. Denies prior similar episodes. Also reports she woke up 11/11 with lightheadedness and fatigue. Reports chronic floaters that are unchanged. Pt also reports she has had 3 weeks of episodic headaches that are generalized however with prominence in bilateral temporal areas, reports that she feels whole scalp tenderness when the headache occurs, also has tenderness when palpating bilateral temporal areas. Denies jaw claudication, fevers, chills, or significant joint pains.     Interval History: Pt initially reported to [facility] where ESR was found to be 50, pt received 60mg IV solumedrol. CTH noncon was wnl.     PAST MEDICAL & SURGICAL HISTORY:  Hypertension  Hyperlipidemia  Atrial fibrillation  Diabetes  No significant past surgical history      Past Ocular History: CE/IOL OU   Ophthalmic Medications:   FAMILY HISTORY:  No pertinent family history in first degree relatives      MEDICATIONS  (STANDING):    MEDICATIONS  (PRN):    Allergies & Intolerances:   No Known Allergies    Review of Systems:  Constitutional: No fever, chills  Eyes: See HPI   Neuro: No tremors  Cardiovascular: No chest pain, palpitations  Respiratory: No SOB, no cough  GI: No nausea, vomiting, abdominal pain  : No dysuria  Skin: no rash  Psych: no depression  Endocrine: no polyuria, polydipsia  Heme/lymph: no swelling    VITALS: T(C): 36.7 (11-12-23 @ 04:25)  T(F): 98 (11-12-23 @ 04:25), Max: 98.1 (11-12-23 @ 03:52)  HR: 85 (11-12-23 @ 04:25) (85 - 87)  BP: 123/73 (11-12-23 @ 04:25) (111/75 - 123/73)  RR:  (18 - 18)  SpO2:  (95% - 97%)  Wt(kg): --  General: AAO x 3, appropriate mood and affect    Ophthalmology Exam:  Visual acuity (cc): 20/25 OD ; 20/50 PH to 20/20 OS  Pupils: PERRL OU, nor APD  Ttono: 14 OU  Extraocular movements (EOMs): Full OU, no pain, no diplopia  Confrontational Visual Field (CVF): Full OU  Color Plates: 12/12 OU    Pen Light Exam (PLE)  External: Flat OU  Lids/Lashes/Lacrimal Ducts: Flat OU    Sclera/Conjunctiva: W+Q OU  Cornea: Cl OU  Anterior Chamber: D+F OU    Iris: Flat OU  Lens: Cl OU    Fundus Exam: dilated with 1% tropicamide and 2.5% phenylephrine  Approval obtained from primary team for dilation  Patient aware that pupils can remained dilated for at least 4-6 hours  Exam performed with 20D lens    Vitreous: wnl OU  Disc, cup/disc: sharp and pink, 0.4 OU ***   Macula: wnl OU  Vessels: wnl OU  Periphery: wnl OU   [facility] DEPARTMENT OF OPHTHALMOLOGY - INITIAL ADULT CONSULT  ----------------------------------------------------------------------------------------------------  Jose Peralta PGY-2  Available on teams  ----------------------------------------------------------------------------------------------------    HPI: 78F with PMH T2DM, HTN, HLD, hypothyroidism, AFib and POHx bilateral cataract surgery presents with 1 episode of transient vision loss and 3 weeks of episodic headaches. Pt reports on 11/11 at around 2PM she noticed her right eye vision was decreased as if a gray film was over her vision for 10-15 minutes, denies pain, flashes, curtain over vision or diplopia. Reports this episode resolved without intervention and that vision is now back to baseline. Denies head or eye trauma. Denies prior similar episodes. Also reports she woke up 11/11 with lightheadedness and fatigue. Reports chronic floaters that are unchanged. Pt also reports she has had 3 weeks of episodic headaches that are generalized however with prominence in bilateral temporal areas, reports that she feels whole scalp tenderness when the headache occurs, also has tenderness when palpating bilateral temporal areas. Denies jaw claudication, fevers, chills, or significant joint pains.     Interval History: Pt initially reported to [facility] where ESR was found to be 50, pt received 60mg IV solumedrol. CTH noncon was wnl. Pt then transferred to [facility] for ophthalmology eval.     PAST MEDICAL & SURGICAL HISTORY:  Hypertension  Hyperlipidemia  Atrial fibrillation  Diabetes  No significant past surgical history      Past Ocular History: CE/IOL OU   Ophthalmic Medications:   FAMILY HISTORY:  No pertinent family history in first degree relatives      MEDICATIONS  (STANDING):    MEDICATIONS  (PRN):    Allergies & Intolerances:   No Known Allergies    Review of Systems:  Constitutional: No fever, chills  Eyes: See HPI   Neuro: No tremors  Cardiovascular: No chest pain, palpitations  Respiratory: No SOB, no cough  GI: No nausea, vomiting, abdominal pain  : No dysuria  Skin: no rash  Psych: no depression  Endocrine: no polyuria, polydipsia  Heme/lymph: no swelling    VITALS: T(C): 36.7 (11-12-23 @ 04:25)  T(F): 98 (11-12-23 @ 04:25), Max: 98.1 (11-12-23 @ 03:52)  HR: 85 (11-12-23 @ 04:25) (85 - 87)  BP: 123/73 (11-12-23 @ 04:25) (111/75 - 123/73)  RR:  (18 - 18)  SpO2:  (95% - 97%)  Wt(kg): --  General: AAO x 3, appropriate mood and affect    Ophthalmology Exam:  Visual acuity (cc): 20/25 OD ; 20/50 PH to 20/20 OS  Pupils: PERRL OU, nor APD  Ttono: 14 OU  Extraocular movements (EOMs): Full OU, no pain, no diplopia  Confrontational Visual Field (CVF): Full OU  Color Plates: 12/12 OU    Pen Light Exam (PLE)  External: Flat OU  Lids/Lashes/Lacrimal Ducts: Flat OU    Sclera/Conjunctiva: W+Q OU  Cornea: Cl OU  Anterior Chamber: D+F OU    Iris: Flat OU  Lens: Cl OU    Fundus Exam: dilated with 1% tropicamide and 2.5% phenylephrine  Approval obtained from primary team for dilation  Patient aware that pupils can remained dilated for at least 4-6 hours  Exam performed with 20D lens    Vitreous: wnl OU  Disc, cup/disc: sharp and pink, 0.3 OU   Macula: wnl OU  Vessels: wnl OU  Periphery: RPE changes    [facility] DEPARTMENT OF OPHTHALMOLOGY - INITIAL ADULT CONSULT  ----------------------------------------------------------------------------------------------------  Jose Peralta PGY-2  Available on teams  ----------------------------------------------------------------------------------------------------    HPI: 78F with PMH T2DM, HTN, HLD, hypothyroidism, AFib and POHx bilateral cataract surgery presents with 1 episode of transient vision loss and 3 weeks of episodic headaches. Pt reports on 11/11 at around 2PM she noticed her right eye vision was decreased as if a gray film was over her vision for 10-15 minutes, denies pain, flashes, curtain over vision or diplopia. Reports this episode resolved without intervention and that vision is now back to baseline. Denies head or eye trauma. Denies prior similar episodes. Also reports she woke up 11/11 with lightheadedness and fatigue. Reports chronic floaters that are unchanged. Pt also reports she has had 3 weeks of episodic headaches that are generalized however with prominence in bilateral temporal areas, reports that she feels whole scalp tenderness when the headache occurs, also has tenderness when palpating bilateral temporal areas. Denies jaw claudication, fevers, chills, or significant joint pains.     Interval History: Pt initially reported to [facility] where ESR was found to be 50, pt received 60mg IV solumedrol. CTH noncon was wnl. Pt then transferred to [facility] for ophthalmology eval.     PAST MEDICAL & SURGICAL HISTORY:  Hypertension  Hyperlipidemia  Atrial fibrillation  Diabetes  No significant past surgical history      Past Ocular History: CE/IOL OU   Ophthalmic Medications:   FAMILY HISTORY:  No pertinent family history in first degree relatives      MEDICATIONS  (STANDING):    MEDICATIONS  (PRN):    Allergies & Intolerances:   No Known Allergies    Review of Systems:  Constitutional: No fever, chills  Eyes: See HPI   Neuro: No tremors  Cardiovascular: No chest pain, palpitations  Respiratory: No SOB, no cough  GI: No nausea, vomiting, abdominal pain  : No dysuria  Skin: no rash  Psych: no depression  Endocrine: no polyuria, polydipsia  Heme/lymph: no swelling    VITALS: T(C): 36.7 (11-12-23 @ 04:25)  T(F): 98 (11-12-23 @ 04:25), Max: 98.1 (11-12-23 @ 03:52)  HR: 85 (11-12-23 @ 04:25) (85 - 87)  BP: 123/73 (11-12-23 @ 04:25) (111/75 - 123/73)  RR:  (18 - 18)  SpO2:  (95% - 97%)  Wt(kg): --  General: AAO x 3, appropriate mood and affect    Ophthalmology Exam:  Visual acuity (cc): 20/25 OD ; 20/50 PH to 20/20 OS  Pupils: PERRL OU, nor APD  Ttono: 14 OU  Extraocular movements (EOMs): Full OU, no pain, no diplopia  Confrontational Visual Field (CVF): Full OU  Color Plates: 12/12 OU    Pen Light Exam (PLE)  External: Flat OU  Lids/Lashes/Lacrimal Ducts: Flat OU    Sclera/Conjunctiva: W+Q OU  Cornea: Cl OU  Anterior Chamber: D+F OU    Iris: Flat OU  Lens: Cl OU    Fundus Exam: dilated with 1% tropicamide and 2.5% phenylephrine  Approval obtained from primary team for dilation  Patient aware that pupils can remained dilated for at least 4-6 hours  Exam performed with 20D lens    Vitreous: wnl OU  Disc, cup/disc: sharp and pink, 0.3 OU   Macula: wnl OU  Vessels: wnl OU  Periphery: RPE changes    [facility] DEPARTMENT OF OPHTHALMOLOGY - INITIAL ADULT CONSULT  ----------------------------------------------------------------------------------------------------  Jose Peralta PGY-2  Available on teams  ----------------------------------------------------------------------------------------------------    HPI: 78F with PMH T2DM, HTN, HLD, hypothyroidism, AFib and POHx bilateral cataract surgery presents with 1 episode of transient vision loss and 3 weeks of episodic headaches. Pt reports on 11/11 at around 2PM she noticed her right eye vision was decreased as if a gray film was over her vision for 10-15 minutes, denies pain, flashes, curtain over vision or diplopia. Reports this episode resolved without intervention and that vision is now back to baseline. Denies head or eye trauma. Denies prior similar episodes. Also reports she woke up 11/11 with lightheadedness and fatigue. Reports chronic floaters that are unchanged. Pt also reports she has had 3 weeks of episodic headaches that are generalized however with prominence in bilateral temporal areas, reports that she feels whole scalp tenderness when the headache occurs, also has tenderness when palpating bilateral temporal areas. Denies jaw claudication, fevers, chills, or significant joint pains.     Interval History: Pt initially reported to [facility] where ESR was found to be 50, pt received 60mg IV solumedrol. CTH noncon was wnl. Pt then transferred to [facility] for ophthalmology eval.     PAST MEDICAL & SURGICAL HISTORY:  Hypertension  Hyperlipidemia  Atrial fibrillation  Diabetes  No significant past surgical history      Past Ocular History: CE/IOL OU   Ophthalmic Medications:   FAMILY HISTORY:  No pertinent family history in first degree relatives      MEDICATIONS  (STANDING):    MEDICATIONS  (PRN):    Allergies & Intolerances:   No Known Allergies    Review of Systems:  Constitutional: No fever, chills  Eyes: See HPI   Neuro: No tremors  Cardiovascular: No chest pain, palpitations  Respiratory: No SOB, no cough  GI: No nausea, vomiting, abdominal pain  : No dysuria  Skin: no rash  Psych: no depression  Endocrine: no polyuria, polydipsia  Heme/lymph: no swelling    VITALS: T(C): 36.7 (11-12-23 @ 04:25)  T(F): 98 (11-12-23 @ 04:25), Max: 98.1 (11-12-23 @ 03:52)  HR: 85 (11-12-23 @ 04:25) (85 - 87)  BP: 123/73 (11-12-23 @ 04:25) (111/75 - 123/73)  RR:  (18 - 18)  SpO2:  (95% - 97%)  Wt(kg): --  General: AAO x 3, appropriate mood and affect    Ophthalmology Exam:  Visual acuity (cc): 20/25 OD ; 20/50 PH to 20/20 OS  Pupils: PERRL OU, nor APD  Ttono: 14 OU  Extraocular movements (EOMs): Full OU, no pain, no diplopia  Confrontational Visual Field (CVF): Full OU  Color Plates: 12/12 OU    Pen Light Exam (PLE)  External: Flat OU  Lids/Lashes/Lacrimal Ducts: Flat OU    Sclera/Conjunctiva: W+Q OU  Cornea: Cl OU  Anterior Chamber: D+F OU    Iris: Flat OU  Lens: Cl OU    Fundus Exam: dilated with 1% tropicamide and 2.5% phenylephrine  Approval obtained from primary team for dilation  Patient aware that pupils can remained dilated for at least 4-6 hours  Exam performed with 20D lens    Vitreous: wnl OU  Disc, cup/disc: sharp and pink, 0.3 OU   Macula: wnl OU  Vessels: wnl OU  Periphery: RPE changes

## 2023-11-12 NOTE — CONSULT NOTE ADULT - PROBLEM SELECTOR RECOMMENDATION 9
Will start Lantus 20 units at bed time.  Will start Admelog 8 units before each meal in addition to Admelog correction scale coverage.  Patient counseled for compliance with consistent low carb diet.  . Will start Lantus 25 units at bed time.  Will start Admelog 8 units before each meal in addition to Admelog correction scale coverage.  Patient counseled for compliance with consistent low carb diet.  .

## 2023-11-12 NOTE — CONSULT NOTE ADULT - HEMATOLOGY/LYMPHATICS
Chief Complaint    Follow up UTI from 3/25/20, not getting better still having pain with urination, odor to urine, urinary frequency/urgency  History of Present Illness      Patient is a 21-year-old female here in clinic today to follow-up on urinary tract infection.  She had a telephone visit on March 25 which was consistent with a urinary tract infection.  She was treated with Bactrim double strength twice daily for 5 days.  She finished the medication on Monday, 7 days ago.  She continues to have dysuria, odor to her urine, and cloudy urine.  No hematuria that she has noticed.  She is not really having much for urgency or frequency.      Positive history of UTIs.  She was seen up in [place], [place] in mid February and was treated with 2 different antibiotics.  A urine culture was never done to her knowledge.      She denies any vaginal itching, discharge, odor.       She has no concern about STDs.  She is monogamous with the same partner since August 2019.  They do not use condoms.  She does have pregnancy protection with the Kyleena IUD.  She is open to checking for gonorrhea and chlamydia since it has been about a year since she has tested.       No fever.  No back pain.  No nausea.  Review of Systems      Negative except as listed in HPI.  Physical Exam   Vitals & Measurements    T: 98.4   F (Tympanic)  HR: 80(Peripheral)  BP: 104/60     HT: 65.5 in       Vitals noted and normal.      Patient is alert, oriented and in no acute distress.      Abdomen: soft, nondistended, nontender.  no guarding.      Back with no CVA tenderness      : normal female external genitalia      vaginal mucosa moist      speculum inserted without difficulty      cervix is normal with no lesions.  IUD strings in place.      swabs obtained for wet prep and GC/Chlamydia      bimanual exam reveals a normal, anteverted uterus and negative adnexa.      UA: trace blood, 1+ LE      micro: 6-10 WBCs/hpf      wet prep:  negative  Assessment/Plan       UTI (urinary tract infection) (N39.0)         treat with macrobid pending UC results        will notify her of UC results and results of GC/Chlamydia testing when they are available        push fluids        Follow up if not improving as anticipated.          Ordered:          nitrofurantoin, = 1 cap(s) ( 100 mg ), Oral, bid, x 7 day(s), # 14 cap(s), 0 Refill(s), Type: Acute, Pharmacy: [id number], 1 cap(s) Oral bid,x7 day(s), (Ordered)                Orders:         Chlamydia/Neisseria gonorrhoeae RNA, TMA* ([facility]), Specimen Type: Swab, Collection Date: 04/05/20 13:11:00 CDT         Culture, Urine, Routine* ([facility]), Specimen Type: Urine (Clean Catch), Collection Date: 04/05/20 13:11:00 CDT         POC URINALYSIS, UA* ([facility]), Specimen Type: Urine, Collection Date: 04/05/20 13:11:00 CDT         Wet Prep Vaginal (Request), Priority: Urgent, Urinary frequency  Pain with urination  Patient Information     Name:JUAN SHAW      Address:      [address] [place] [id number]     Sex:Female     YOB: 1999     [phone]     Emergency Contact:MARCO SHAW     MRN:[id number]     FIN:[id number]     Location:[facility]     Date of Service:04/05/2020      Primary Care Physician:       NONE ,       Attending Physician:       Peyton John MD, [phone]  Problem List/Past Medical History    Ongoing     Acne vulgaris     Asthma     NANDO (generalized anxiety disorder)     Mild major depression    Historical     H/O: chickenpox  Procedure/Surgical History     Insertion of IUD (06/14/2019)      Comments: Consent form signed with [facility]. Kyleena IUD placed.      Due for removal by 6/14/2024      Lot:[id number]      Exp: 02/2021.     Myringotomy and insertion of tympanic ventilation tube  Medications    Clindagel 1% topical gel, 1 ximena, Topical, hs, 5 refills    Flonase 50 mcg/inh nasal spray, 2 spray(s), Nasal, daily, 6  refills    Kyleena 19.5 mg intrauterine device, 19.5 mg= 1 EA, Intrauteral, once    Macrobid 100 mg oral capsule, 100 mg= 1 cap(s), Oral, bid    ondansetron 4 mg oral tablet, disintegrating, 4 mg= 1 tab(s), Oral, q8 hrs, 1 refills    Proventil HFA 90 mcg/inh inhalation aerosol, 2 puff(s), Inhale, q4-6 hrs, 1 refills    sertraline 100 mg oral tablet, 1.5 tab(s), Oral, daily, 3 refills    SUMAtriptan 50 mg oral tablet, See Instructions, PRN, 11 refills    Wellbutrin  mg/24 hours oral tablet, extended release, 150 mg= 1 tab(s), Oral, q 24 hrs, 1 refills  Allergies    No Known Medication Allergies  Social History    Smoking Status - 04/05/2020     Never smoker     Alcohol      Current, 1-2 times per month, 4 drinks/episode average. 5 drinks/episode maximum., 09/11/2018     Home/Environment      Risks in environment: Gun in the home.., 12/31/2014     Tobacco      Never, 12/31/2014  Family History    Diabetes mellitus: Grandfather (M).    HTN - Hypertension: Grandfather (M) and Grandmother (M).    Seizure: Brother.  Immunizations      Vaccine Date Status          Hep A, pediatric/adolescent 05/01/2019 Given          typhoid, inactivated 05/01/2019 Given          meningococcal conjugate vaccine 10/11/2018 Given          influenza virus vaccine, inactivated 10/11/2018 Given          Hep A, pediatric/adolescent 09/01/2015 Given          tetanus/diphth/pertuss (Tdap) adult/adol 09/01/2015 Given          human papillomavirus vaccine 09/01/2015 Given          Hep A, pediatric/adolescent 04/23/2015 Given          human papillomavirus vaccine 04/23/2015 Given          meningococcal conjugate vaccine 07/23/2013 Recorded          human papillomavirus vaccine 07/23/2013 Recorded          influenza virus vaccine, inactivated 11/28/2012 Recorded          tetanus/diphth/pertuss (Tdap) adult/adol 11/28/2012 Recorded          human papillomavirus vaccine 11/28/2012 Recorded          DTaP 08/21/2006 Recorded          MMR  (measles/mumps/rubella) 08/21/2006 Recorded          IPV 08/21/2006 Recorded          DTaP 09/03/2003 Recorded          varicella 09/13/2002 Recorded          Hep B 09/13/2002 Recorded          Hep B-Hib 03/27/2002 Recorded          MMR (measles/mumps/rubella) 03/27/2002 Recorded          DTaP 2001 Recorded          pneumococcal (PCV7) 2001 Recorded          IPV 2001 Recorded          DTaP 2001 Recorded          Hib (PRP-T) 2001 Recorded          IPV 2001 Recorded          DTaP 2001 Recorded          Hep B-Hib 2001 Recorded          IPV 2001 Recorded          Hep B 2001 Recorded          pneumococcal (PCV7) - Not Given              Comments : Not Necessary          pneumococcal (PCV7) - Not Given              Comments : Not Necessary          pneumococcal (PCV7) - Not Given              Comments : Not Necessary          Hib (HbOC) - Not Given              Comments : Not Necessary  Lab Results       Lab Results (Last 4 results within 90 days)        UA Epithelial Cells: Few [None  - Few] (04/05/20 15:12:00)       UA WBC: 6-10 [None  - 5] (04/05/20 15:12:00)       UA RBC: 0-2 [None  - 2] (04/05/20 15:12:00)       UA Bacteria: Few [None  - Few] (04/05/20 15:12:00)       Group A Strep POC: NOT DETECTED (01/08/20 09:38:00)       Culture Strep A: See comment (01/08/20 09:55:00)       Wet Prep Yeast: None Seen (04/05/20 15:18:00)       Wet Prep Trichomonas: None Seen (04/05/20 15:18:00)       Wet Prep Clue Cells: None Seen (04/05/20 15:18:00)   negative

## 2023-11-12 NOTE — ED PROVIDER NOTE - ATTENDING CONTRIBUTION TO CARE
Afebrile. Awake and Alert. Sclera clear. ELIU +3. CN II-XII grossly intact. Moves all extremities without lateralization.

## 2023-11-12 NOTE — CONSULT NOTE ADULT - PROBLEM SELECTOR RECOMMENDATION 9
I Florian Diana MD have participated in the daily care of this patient and have performed a history and physical exam of the patient and discussed  the findings and plan with the house officer. I reviewed the resident note and agree with the findings and plan

## 2023-11-12 NOTE — CONSULT NOTE ADULT - ASSESSMENT
NOTE INCOMPLETE     78F with PMH T2DM, HTN, HLD, hypothyroidism, AFib and POHx bilateral cataract surgery presents with 1 episode of transient vision loss and 3 weeks of episodic headaches, found to have normal ophthalmic exam.    # GCA rule out   # Transient vision loss, right eye   - 78F had transient vision loss, now resolved, in the setting of same-day light-headedness and 3 weeks of episodic temporal headaches and scalp tenderness  - Denies jaw claudication, fevers, arthralgias  - VA 20/20 OD/OS, IOP 14 OU, no rAPD OU, color plates/EOMs/CVF full OU   - Anterior exam wnl OU   - DFE ***   - ESR found to be 50 at OSH prior to transfer   - Differential includes: amaurosis fugax/TIA, GCA  - Recommend stroke work up (due to concern for amaurosis) and neuro consult  - Recommend MR brain w/wo and MR orbit w/wo  - Recommend ***     Discussed with Dr. Graves, chief resident.     Outpatient Follow-up: Patient should follow-up with his/her ophthalmologist or with [facility] Department of Ophthalmology within 1 week of after discharge at:    [address] [facility]  [place] [address]  [phone] NOTE INCOMPLETE     78F with PMH T2DM, HTN, HLD, hypothyroidism, AFib and POHx bilateral cataract surgery presents with 1 episode of transient vision loss and 3 weeks of episodic headaches, found to have normal ophthalmic exam.    # GCA rule out   # Transient vision loss, right eye   - 78F had transient vision loss, now resolved, in the setting of same-day light-headedness and 3 weeks of episodic temporal headaches and scalp tenderness  - Denies jaw claudication, fevers, arthralgias  - VA 20/20 OD/OS, IOP 14 OU, no rAPD OU, color plates/EOMs/CVF full OU   - Anterior exam wnl OU   - DFE ***   - ESR found to be 50 at OSH prior to transfer   - Differential includes: amaurosis fugax/TIA, GCA  - Recommend stroke work up (due to concern for amaurosis) and neuro consult  - Recommend MR brain w/wo and MR orbit w/wo  - Recommend ***     Discussed with Dr. Graves, chief resident.     Outpatient Follow-up: Patient should follow-up with his/her ophthalmologist or with [facility] Department of Ophthalmology within 1 week of after discharge at:    [address] [facility]  [place] [address]  [phone] NOTE INCOMPLETE     78F with PMH T2DM, HTN, HLD, hypothyroidism, AFib and POHx bilateral cataract surgery presents with 1 episode of transient vision loss and 3 weeks of episodic headaches, found to have normal ophthalmic exam.    # GCA rule out   # Transient vision loss, right eye   - 78F had transient vision loss, now resolved, in the setting of same-day light-headedness and 3 weeks of episodic temporal headaches and scalp tenderness  - Denies jaw claudication, fevers, arthralgias  - VA 20/20 OD/OS, IOP 14 OU, no rAPD OU, color plates/EOMs/CVF full OU   - Anterior exam wnl OU   - DFE ***   - ESR found to be 50 at OSH prior to transfer   - Differential includes: amaurosis fugax/TIA, GCA  - Recommend stroke work up (due to concern for amaurosis) and neuro consult  - Recommend MR brain w/wo and MR orbit w/wo  - Recommend ***     Discussed with Dr. Graves, chief resident.     Outpatient Follow-up: Patient should follow-up with his/her ophthalmologist or with [facility] Department of Ophthalmology within 1 week of after discharge at:    [address] [facility]  [place] [address]  [phone] NOTE INCOMPLETE     78F with PMH T2DM, HTN, HLD, hypothyroidism, AFib and POHx bilateral cataract surgery presents with 1 episode of transient vision loss and 3 weeks of episodic headaches, found to have normal ophthalmic exam.    # GCA rule out   # Transient vision loss, right eye   - 78F had transient vision loss, now resolved, in the setting of same-day light-headedness and 3 weeks of episodic temporal headaches and scalp tenderness  - Denies jaw claudication, fevers, arthralgias  - VA 20/20 OD/OS, IOP 14 OU, no rAPD OU, color plates/EOMs/CVF full OU   - Anterior exam wnl OU   - DFE ***   - ESR found to be 50 at OSH prior to transfer   - Differential includes: amaurosis fugax/TIA, GCA  - Recommend repeat ESR/CRP/CBC  - Recommend stroke work up (due to concern for amaurosis) and neuro consult  - Recommend MR brain w/wo and MR orbit w/wo  - Recommend ***     Discussed with Dr. Graves, chief resident.     Outpatient Follow-up: Patient should follow-up with his/her ophthalmologist or with [facility] Department of Ophthalmology within 1 week of after discharge at:    [address] [facility]  [place] [address]  [phone] NOTE INCOMPLETE     78F with PMH T2DM, HTN, HLD, hypothyroidism, AFib and POHx bilateral cataract surgery presents with 1 episode of transient vision loss and 3 weeks of episodic headaches, found to have normal ophthalmic exam.    # GCA rule out   # Transient vision loss, right eye   - 78F had transient vision loss, now resolved, in the setting of same-day light-headedness and 3 weeks of episodic temporal headaches and scalp tenderness  - Denies jaw claudication, fevers, arthralgias  - VA 20/20 OD/OS, IOP 14 OU, no rAPD OU, color plates/EOMs/CVF full OU   - Anterior exam wnl OU   - DFE ***   - ESR found to be 50 at OSH prior to transfer   - Differential includes: amaurosis fugax/TIA, GCA  - Recommend repeat ESR/CRP/CBC  - Recommend stroke work up (due to concern for amaurosis) and neuro consult  - Recommend MR brain w/wo and MR orbit w/wo  - Recommend ***     Discussed with Dr. Graves, chief resident.     Outpatient Follow-up: Patient should follow-up with his/her ophthalmologist or with [facility] Department of Ophthalmology within 1 week of after discharge at:    [address] [facility]. [address]  [place] [address]  [phone] NOTE INCOMPLETE     78F with PMH T2DM, HTN, HLD, hypothyroidism, AFib and POHx bilateral cataract surgery presents with 1 episode of transient vision loss and 3 weeks of episodic headaches, found to have normal ophthalmic exam.    # GCA rule out   # Transient vision loss, right eye   - 78F had transient vision loss, now resolved, in the setting of same-day light-headedness and 3 weeks of episodic temporal headaches and scalp tenderness  - Denies jaw claudication, fevers, arthralgias  - VA 20/20 OD/OS, IOP 14 OU, no rAPD OU, color plates/EOMs/CVF full OU   - Anterior exam wnl OU   - DFE ***   - ESR found to be 50 at OSH prior to transfer   - Differential includes: amaurosis fugax/TIA, GCA  - Recommend repeat ESR/CRP/CBC  - Recommend stroke work up (due to concern for amaurosis) and neuro consult  - Recommend MR brain w/wo and MR orbit w/wo  - Recommend ***     Discussed with Dr. Graves, chief resident.     Outpatient Follow-up: Patient should follow-up with his/her ophthalmologist or with [facility] Department of Ophthalmology within 1 week of after discharge at:    [address] [facility]. [address]  [place] [address]  [phone] NOTE INCOMPLETE     78F with PMH T2DM, HTN, HLD, hypothyroidism, AFib and POHx bilateral cataract surgery presents with 1 episode of transient vision loss and 3 weeks of episodic headaches, found to have normal ophthalmic exam.    # GCA rule out   # Transient vision loss, right eye   - 78F had transient vision loss, now resolved, in the setting of same-day light-headedness and 3 weeks of episodic temporal headaches and scalp tenderness  - Denies jaw claudication, fevers, arthralgias  - VA 20/20 OD/OS, IOP 14 OU, no rAPD OU, color plates/EOMs/CVF full OU   - Anterior exam and DFE wnl OU   - Temporal artery pulses 2+ bilaterally, currently no scalp tenderness on evaluation  - ESR found to be 50 at OSH prior to transfer   - Differential includes: amaurosis fugax/TIA, GCA  - Recommend repeat ESR/CRP/CBC  - Recommend stroke work up (due to concern for amaurosis) including CTA head/neck, carotid dopplers, echo, additional workup per neuro ***   - Recommend neuro consult  - Recommend MR brain w/wo and MR orbit w/wo  - Recommend ***     Discussed with Dr. Graves, chief resident.     Outpatient Follow-up: Patient should follow-up with his/her ophthalmologist or with [facility] Department of Ophthalmology within 1 week of after discharge at:    [address] [facility]  [place] [address]  [phone] NOTE INCOMPLETE     78F with PMH T2DM, HTN, HLD, hypothyroidism, AFib and POHx bilateral cataract surgery presents with 1 episode of transient vision loss and 3 weeks of episodic headaches, found to have normal ophthalmic exam.    # GCA rule out   # Transient vision loss, right eye   - 78F had transient vision loss, now resolved, in the setting of same-day light-headedness and 3 weeks of episodic temporal headaches and scalp tenderness  - Denies jaw claudication, fevers, arthralgias  - VA 20/20 OD/OS, IOP 14 OU, no rAPD OU, color plates/EOMs/CVF full OU   - Anterior exam and DFE wnl OU   - Temporal artery pulses 2+ bilaterally, currently no scalp tenderness on evaluation  - ESR found to be 50 at OSH prior to transfer   - Differential includes: amaurosis fugax/TIA, GCA  - Recommend repeat ESR/CRP/CBC  - Recommend stroke work up (due to concern for amaurosis) including CTA head/neck, carotid dopplers, echo, additional workup per neuro ***   - Recommend neuro consult  - Recommend MR brain w/wo and MR orbit w/wo  - Recommend ***     Discussed with Dr. Graves, chief resident.     Outpatient Follow-up: Patient should follow-up with his/her ophthalmologist or with [facility] Department of Ophthalmology within 1 week of after discharge at:    [address] [facility]  [place] [address]  [phone] NOTE INCOMPLETE     78F with PMH T2DM, HTN, HLD, hypothyroidism, AFib and POHx bilateral cataract surgery presents with 1 episode of transient vision loss and 3 weeks of episodic headaches, found to have normal ophthalmic exam.    # GCA rule out   # Transient vision loss, right eye   - 78F had transient vision loss, now resolved, in the setting of same-day light-headedness and 3 weeks of episodic temporal headaches and scalp tenderness  - Denies jaw claudication, fevers, arthralgias  - VA 20/20 OD/OS, IOP 14 OU, no rAPD OU, color plates/EOMs/CVF full OU   - Anterior exam and DFE wnl OU   - Temporal artery pulses 2+ bilaterally, currently no scalp tenderness on evaluation  - ESR found to be 50 at OSH prior to transfer   - Differential includes: amaurosis fugax/TIA, GCA  - Recommend repeat ESR/CRP/CBC  - Recommend stroke work up (due to concern for amaurosis) including CTA head/neck, carotid dopplers, echo, additional workup per neuro ***   - Recommend neuro consult  - Recommend MR brain w/wo and MR orbit w/wo  - Recommend ***     Discussed with Dr. Graves, chief resident.     Outpatient Follow-up: Patient should follow-up with his/her ophthalmologist or with [facility] Department of Ophthalmology within 1 week of after discharge at:    [address] [facility]  [place] [address]  [phone] 78F with PMH T2DM, HTN, HLD, hypothyroidism, AFib and POHx bilateral cataract surgery presents with 1 episode of transient vision loss and 3 weeks of episodic headaches, found to have normal ophthalmic exam.    # GCA rule out   # Transient vision loss, right eye   - 78F had transient vision loss, now resolved, in the setting of same-day light-headedness and 3 weeks of episodic temporal headaches and scalp tenderness  - Denies jaw claudication, fevers, arthralgias  - VA 20/20 OD/OS, IOP 14 OU, no rAPD OU, color plates/EOMs/CVF full OU   - Anterior exam and DFE wnl OU   - Temporal artery pulses 2+ bilaterally, currently no scalp tenderness on evaluation  - ESR found to be elevated at50 at OSH prior to transfer   - Differential includes: amaurosis fugax/TIA, GCA  - Recommend repeat ESR/CRP/CBC  - Recommend neuro consult  - Recommend stroke work up (due to concern for amaurosis) per neuro  - Recommend MR brain w/wo and MR orbit w/wo  - Recommend 1g IV solumedrol for now due to continued suspicion for GCA  - Recommend rheum consult for suspected GCA     Discussed with Dr. Graves, chief resident.     Outpatient Follow-up: Patient should follow-up with his/her ophthalmologist or with [facility] Department of Ophthalmology within 1 week of after discharge at:    [address] [facility]  [place] [address]  [phone] 78F with PMH T2DM, HTN, HLD, hypothyroidism, AFib and POHx bilateral cataract surgery presents with 1 episode of transient vision loss and 3 weeks of episodic headaches, found to have normal ophthalmic exam.    # GCA rule out   # Transient vision loss, right eye   - 78F had transient vision loss, now resolved, in the setting of same-day light-headedness and 3 weeks of episodic temporal headaches and scalp tenderness  - Denies jaw claudication, fevers, arthralgias  - VA 20/20 OD/OS, IOP 14 OU, no rAPD OU, color plates/EOMs/CVF full OU   - Anterior exam and DFE wnl OU   - Temporal artery pulses 2+ bilaterally, currently no scalp tenderness on evaluation  - ESR found to be elevated at50 at OSH prior to transfer   - Differential includes: amaurosis fugax/TIA, GCA  - Recommend repeat ESR/CRP/CBC  - Recommend neuro consult  - Recommend stroke work up (due to concern for amaurosis) per neuro  - Recommend MR brain w/wo and MR orbit w/wo  - Recommend 1g IV solumedrol for now due to continued suspicion for GCA  - Recommend rheum consult for suspected GCA     Discussed with Dr. Graves, chief resident.     Outpatient Follow-up: Patient should follow-up with his/her ophthalmologist or with [facility] Department of Ophthalmology within 1 week of after discharge at:    [address] [facility]  [place] [address]  [phone] 78F with PMH T2DM, HTN, HLD, hypothyroidism, AFib and POHx bilateral cataract surgery presents with 1 episode of transient vision loss and 3 weeks of episodic headaches, found to have normal ophthalmic exam.    # GCA rule out   # Transient vision loss, right eye   - 78F had transient vision loss, now resolved, in the setting of same-day light-headedness and 3 weeks of episodic temporal headaches and scalp tenderness  - Denies jaw claudication, fevers, arthralgias  - VA 20/20 OD/OS, IOP 14 OU, no rAPD OU, color plates/EOMs/CVF full OU   - Anterior exam and DFE wnl OU   - Temporal artery pulses 2+ bilaterally, currently no scalp tenderness on evaluation  - ESR found to be elevated at50 at OSH prior to transfer   - Differential includes: amaurosis fugax/TIA, GCA  - Recommend repeat ESR/CRP/CBC  - Recommend neuro consult  - Recommend stroke work up (due to concern for amaurosis) per neuro  - Recommend MR brain w/wo and MR orbit w/wo  - Recommend 1g IV solumedrol for now due to continued suspicion for GCA  - Recommend rheum consult for suspected GCA     Discussed with Dr. Graves, chief resident.     Outpatient Follow-up: Patient should follow-up with his/her ophthalmologist or with [facility] Department of Ophthalmology within 1 week of after discharge at:    [address] [facility]  [place] [address]  [phone] 78F with PMH T2DM, HTN, HLD, hypothyroidism, AFib and POHx bilateral cataract surgery presents with 1 episode of transient vision loss and 3 weeks of episodic headaches, found to have normal ophthalmic exam.    # GCA rule out   # Transient vision loss, right eye   - 78F had transient vision loss, now resolved, in the setting of same-day light-headedness and 3 weeks of episodic temporal headaches and scalp tenderness  - Denies jaw claudication, fevers, arthralgias  - VA 20/20 OD/OS, IOP 14 OU, no rAPD OU, color plates/EOMs/CVF full OU   - Anterior exam and DFE wnl OU   - Temporal artery pulses 2+ bilaterally, currently no scalp tenderness on evaluation  - ESR found to be elevated at50 at OSH prior to transfer   - Differential includes: amaurosis fugax/TIA, GCA  - Recommend repeat ESR/CRP/CBC  - Recommend neuro consult  - Recommend stroke work up (due to concern for amaurosis) per neuro  - Recommend MR brain w/wo and MR orbit w/wo  - Recommend 1g IV solumedrol for now due to continued suspicion for GCA  - Recommend rheum consult for possible GCA     Discussed with Dr. Graves, chief resident.     Outpatient Follow-up: Patient should follow-up with his/her ophthalmologist or with [facility] Department of Ophthalmology within 1 week of after discharge at:    [address] [facility]  [place] [address]  [phone] 78F with PMH T2DM, HTN, HLD, hypothyroidism, AFib and POHx bilateral cataract surgery presents with 1 episode of transient vision loss and 3 weeks of episodic headaches, found to have normal ophthalmic exam.    # GCA rule out   # Transient vision loss, right eye   - 78F had transient vision loss, now resolved, in the setting of same-day light-headedness and 3 weeks of episodic temporal headaches and scalp tenderness  - Denies jaw claudication, fevers, arthralgias  - VA 20/20 OD/OS, IOP 14 OU, no rAPD OU, color plates/EOMs/CVF full OU   - Anterior exam and DFE wnl OU   - Temporal artery pulses 2+ bilaterally, currently no scalp tenderness on evaluation  - ESR found to be elevated at50 at OSH prior to transfer   - Differential includes: amaurosis fugax/TIA, GCA  - Recommend repeat ESR/CRP/CBC  - Recommend neuro consult  - Recommend stroke work up (due to concern for amaurosis) per neuro  - Recommend MR brain w/wo and MR orbit w/wo  - Recommend 1g IV solumedrol for now due to continued suspicion for GCA  - Recommend rheum consult for possible GCA     Discussed with Dr. Graves, chief resident.     Outpatient Follow-up: Patient should follow-up with his/her ophthalmologist or with [facility] Department of Ophthalmology within 1 week of after discharge at:    [address] [facility]  [place] [address]  [phone] 78F with PMH T2DM, HTN, HLD, hypothyroidism, AFib and POHx bilateral cataract surgery presents with 1 episode of transient vision loss and 3 weeks of episodic headaches, found to have normal ophthalmic exam.    # GCA rule out   # Transient vision loss, right eye   - 78F had transient vision loss, now resolved, in the setting of same-day light-headedness and 3 weeks of episodic temporal headaches and scalp tenderness  - Denies jaw claudication, fevers, arthralgias  - VA 20/20 OD/OS, IOP 14 OU, no rAPD OU, color plates/EOMs/CVF full OU   - Anterior exam and DFE wnl OU   - Temporal artery pulses 2+ bilaterally, currently no scalp tenderness on evaluation  - ESR found to be elevated at50 at OSH prior to transfer   - Differential includes: amaurosis fugax/TIA, GCA  - Recommend repeat ESR/CRP/CBC  - Recommend neuro consult  - Recommend stroke work up (due to concern for amaurosis) per neuro  - Recommend MR brain w/wo and MR orbit w/wo  - Recommend 1g IV solumedrol for now due to continued suspicion for GCA  - Recommend rheum consult for possible GCA     Discussed with Dr. Graves, chief resident.     Outpatient Follow-up: Patient should follow-up with his/her ophthalmologist or with [facility] Department of Ophthalmology within 1 week of after discharge at:    [address] [facility]  [place] [address]  [phone]

## 2023-11-12 NOTE — CONSULT NOTE ADULT - SUBJECTIVE AND OBJECTIVE BOX
Neurology - Consult Note    -  Spectra: [phone] ([facility]), [phone] ([facility])  -    HPI: Patient CARMELINA JASON is a 78y (1945) woman with a PMHx significant for T2DM, HTN, HLD, hypothyroidism, AFib and bilateral cataract surgery presents with 1 episode of transient vision loss and 3 weeks of episodic headaches. Pt reports on 11/11 at around 2PM she noticed her right eye vision was decreased as if a gray film was over her vision for 10-15 minutes. Denies pain, flashes, curtain over vision or diplopia when assessed by Ophthalmology. Reports this episode resolved without intervention and that vision is now back to baseline. Denies head or eye trauma. Denies prior similar episodes. Also reports she woke up 11/11 with lightheadedness and fatigue. Reports chronic floaters that are unchanged. Pt also reports she has had 3 weeks of episodic headaches that are generalized however with prominence in bilateral temporal areas, reports that she feels whole scalp tenderness when the headache occurs, also has tenderness when palpating bilateral temporal areas. Denies jaw claudication, fevers, chills, or significant joint pains.     Interval History: Pt initially reported to [facility] where ESR was found to be 50, pt received 60mg IV solumedrol. CTH noncon was wnl. Pt then transferred to [facility] for ophthalmology evaluation. This morning received 1g IV solumedrol. CRP 23.  Patient reports that her vision is at baseline now, denies any visual deficit, headache, or temporal/ scalp tenderness at present. She reports her right eye did appear to have a curtain like grey haze yesterday lasting about 10-15 minutes, then spontaneously resolved.    Review of Systems:    CONSTITUTIONAL: No fevers or chills  EYES AND ENT: No visual changes or no throat pain   NECK: No pain or stiffness  RESPIRATORY: No hemoptysis or shortness of breath  CARDIOVASCULAR: No chest pain or palpitations  GASTROINTESTINAL: No melena or hematochezia  GENITOURINARY: No dysuria or hematuria  NEUROLOGICAL: +As stated in HPI above  SKIN: No itching, burning, rashes, or lesions   All other review of systems is negative unless indicated above.    Allergies:  No Known Allergies      PMHx/PSHx/Family Hx: As above, otherwise see below   Hypertension  Hyperlipidemia  Atrial fibrillation  Diabetes      Social Hx:  No current use of tobacco, alcohol, or illicit drugs  Lives with     Medications:  MEDICATIONS  (STANDING):    MEDICATIONS  (PRN):      Vitals:  T(C): 36.7 (11-12-23 @ 07:20), Max: 36.7 (11-12-23 @ 03:52)  HR: 82 (11-12-23 @ 07:20) (82 - 87)  BP: 111/62 (11-12-23 @ 07:20) (111/62 - 123/73)  RR: 16 (11-12-23 @ 07:20) (16 - 18)  SpO2: 96% (11-12-23 @ 07:20) (95% - 97%)    Physical Examination: INCOMPLETE  General - NAD  Cardiovascular - Peripheral pulses palpable, no edema  Eyes - Fundoscopy not performed due to safety precautions in the setting of the COVID-19 pandemic    Neurologic Exam:  Mental status - Awake, Alert, Oriented to person, place, and time. Speech fluent, repetition and naming intact. Follows simple and complex commands. Attention/concentration, recent and remote memory (including registration and recall), and fund of knowledge intact    Cranial nerves - PERRLA, VFF, EOMI, face sensation (V1-V3) intact b/l, facial strength intact without asymmetry b/l, hearing intact b/l, palate with symmetric elevation, trapezius OR sternocleidomastiod 5/5 strength b/l, tongue midline on protrusion with full lateral movement    Motor - Normal bulk and tone throughout. No pronator drift.  Strength testing            Deltoid      Biceps      Triceps     Wrist Extension    Wrist Flexion     Interossei         R            5                 5               5                     5                              5                        5                 5  L             5                 5               5                     5                              5                        5                 5              Hip Flexion    Hip Extension    Knee Flexion    Knee Extension    Dorsiflexion    Plantar Flexion  R              5                           5                       5                           5                            5                          5  L              5                           5                        5                           5                            5                          5    Sensation - Light touch/temperature OR pain/vibration intact throughout    DTR's -             Biceps      Triceps     Brachioradialis      Patellar    Ankle    Toes/plantar response  R             2+             2+                  2+                       2+            2+                 Down  L              2+             2+                 2+                        2+           2+                 Down    Coordination - Finger to Nose intact b/l. No tremors appreciated    Gait and station - Normal casual gait. Romberg (-)    Labs:                        12.1   7.97  )-----------( 309      ( 12 Nov 2023 06:26 )             37.0     11-12    139  |  104  |  19  ----------------------------<  312<H>  4.8   |  24  |  0.74    Ca    9.0      12 Nov 2023 06:26    TPro  6.9  /  Alb  3.5  /  TBili  0.2  /  DBili  x   /  AST  12  /  ALT  11  /  AlkPhos  96  11-12    CAPILLARY BLOOD GLUCOSE        LIVER FUNCTIONS - ( 12 Nov 2023 06:26 )  Alb: 3.5 g/dL / Pro: 6.9 g/dL / ALK PHOS: 96 U/L / ALT: 11 U/L / AST: 12 U/L / GGT: x             PT/INR - ( 12 Nov 2023 06:33 )   PT: 13.0 sec;   INR: 1.19 ratio         PTT - ( 12 Nov 2023 06:33 )  PTT:30.3 sec      Radiology:    [facility] 11/11  FINDINGS:  There is no acute intracranial hemorrhage or mass effect. There are areas of  hypodensity in the bilateral hemispheric white matter suggesting white  matter microvascular ischemic change. The ventricles and sulci are normal in  size for patient's age.  There is no extraaxial fluid collection  There is no displaced calvarial fracture. Status post bilateral intraocular  lens implants. The visualized portions of the paranasal sinuses are well  aerated. The mastoid air cells are well aerated.    IMPRESSION: No acute intracranial hemorrhage or mass effect. Neurology - Consult Note    -  Spectra: [phone] ([facility]), [phone] ([facility])  -    HPI: Patient CARMELINA JASON is a 78y (1945) woman with a PMHx significant for T2DM, HTN, HLD, hypothyroidism, AFib and bilateral cataract surgery presents with 1 episode of transient vision loss and 3 weeks of episodic headaches. Pt reports on 11/11 at around 2PM she noticed her right eye vision was decreased as if a gray film was over her vision for 10-15 minutes. Denies pain, flashes, curtain over vision or diplopia when assessed by Ophthalmology. Reports this episode resolved without intervention and that vision is now back to baseline. Denies head or eye trauma. Denies prior similar episodes. Also reports she woke up 11/11 with lightheadedness and fatigue. Reports chronic floaters that are unchanged. Pt also reports she has had 3 weeks of episodic headaches that are generalized however with prominence in bilateral temporal areas, reports that she feels whole scalp tenderness when the headache occurs, also has tenderness when palpating bilateral temporal areas. She denied jaw claudication, fevers, chills, or significant joint pains.     Interval History:   Pt initially reported to [facility] where ESR was found to be 50, pt received 60mg IV solumedrol. CTH noncon was wnl. Pt then transferred to [facility] for ophthalmology evaluation. This morning received 1g IV solumedrol. CRP 23.  Patient reports that her vision is at baseline now, denies any visual deficit, headache, or temporal/ scalp tenderness at present. She reports her right eye did appear to have a curtain like grey haze yesterday lasting about 10-15 minutes, then spontaneously resolved. This morning patient reports that her symptoms may be dental related and does endorse jaw pain. Denies any joint pain.     Review of Systems:    CONSTITUTIONAL: No fevers or chills  EYES AND ENT: No visual changes or no throat pain   NECK: No pain or stiffness  RESPIRATORY: No hemoptysis or shortness of breath  CARDIOVASCULAR: No chest pain or palpitations  GASTROINTESTINAL: No melena or hematochezia  GENITOURINARY: No dysuria or hematuria  NEUROLOGICAL: +As stated in HPI above  SKIN: No itching, burning, rashes, or lesions   All other review of systems is negative unless indicated above.    Allergies:  No Known Allergies      PMHx/PSHx/Family Hx: As above, otherwise see below   Hypertension  Hyperlipidemia  Atrial fibrillation  Diabetes      Social Hx:  No current use of tobacco, alcohol, or illicit drugs    Medications:  MEDICATIONS  (STANDING):    MEDICATIONS  (PRN):      Vitals:  T(C): 36.7 (11-12-23 @ 07:20), Max: 36.7 (11-12-23 @ 03:52)  HR: 82 (11-12-23 @ 07:20) (82 - 87)  BP: 111/62 (11-12-23 @ 07:20) (111/62 - 123/73)  RR: 16 (11-12-23 @ 07:20) (16 - 18)  SpO2: 96% (11-12-23 @ 07:20) (95% - 97%)    Physical Examination:   General - NAD  Cardiovascular - Peripheral pulses palpable, no edema  Eyes - Fundoscopy not performed due to safety precautions in the setting of the COVID-19 pandemic  No tenderness to temporal, scalp, or jaw palpation    Neurologic Exam:  Mental status - Awake, Alert, Oriented to person, place, and time. Speech fluent, repetition and naming intact. Follows simple commands. Attention/concentration, recent and remote memory (including registration and recall), and fund of knowledge grossly intact    Cranial nerves - PERRLA, VFF, EOMI, face sensation (V1-V3) intact b/l, facial strength intact without asymmetry b/l, hearing intact b/l, palate with symmetric elevation, trapezius 5/5 strength b/l, tongue midline on protrusion with full lateral movement    Motor - Normal bulk and tone throughout. No pronator drift.  Strength testing            Deltoid      Biceps      Triceps     Wrist Extension    Wrist Flexion     Interossei         R            5                 5               5                     5                              5                        5                 5  L             5                 5               5                     5                              5                        5                 5              Hip Flexion    Hip Extension    Knee Flexion    Knee Extension    Dorsiflexion    Plantar Flexion  R              5                           5                       5                           5                            5                          5  L              5                           5                        5                           5                            5                          5    Sensation - Light touch intact throughout    DTR's -             Biceps      Triceps     Brachioradialis      Patellar    Ankle    Toes/plantar response  R             2+             2+                  2+                       2+            2+                 Down  L              2+             2+                 2+                        2+           2+                 Down    Coordination - Finger to Nose intact b/l. No tremors appreciated    Gait and station - Not assessed due to fall risk    Labs:                        12.1   7.97  )-----------( 309      ( 12 Nov 2023 06:26 )             37.0     11-12    139  |  104  |  19  ----------------------------<  312<H>  4.8   |  24  |  0.74    Ca    9.0      12 Nov 2023 06:26    TPro  6.9  /  Alb  3.5  /  TBili  0.2  /  DBili  x   /  AST  12  /  ALT  11  /  AlkPhos  96  11-12    CAPILLARY BLOOD GLUCOSE        LIVER FUNCTIONS - ( 12 Nov 2023 06:26 )  Alb: 3.5 g/dL / Pro: 6.9 g/dL / ALK PHOS: 96 U/L / ALT: 11 U/L / AST: 12 U/L / GGT: x             PT/INR - ( 12 Nov 2023 06:33 )   PT: 13.0 sec;   INR: 1.19 ratio         PTT - ( 12 Nov 2023 06:33 )  PTT:30.3 sec      Radiology:    [facility] 11/11  FINDINGS:  There is no acute intracranial hemorrhage or mass effect. There are areas of  hypodensity in the bilateral hemispheric white matter suggesting white  matter microvascular ischemic change. The ventricles and sulci are normal in  size for patient's age.  There is no extraaxial fluid collection  There is no displaced calvarial fracture. Status post bilateral intraocular  lens implants. The visualized portions of the paranasal sinuses are well  aerated. The mastoid air cells are well aerated.    IMPRESSION: No acute intracranial hemorrhage or mass effect.    < from: CT Angio Neck w/ IV Cont (11.12.23 @ 10:11) >  CTA NECK    Normal flow-related enhancement of the bilateral common and internal   carotid arteries.    Normal flow-related enhancement of the bilateral vertebral arteries.    No flow-limiting stenosis, evidence for arterial dissection, or vascular   aneurysm.    Multiple hypodensities with peripheral calcifications within the   bilateral lobes of thyroid and isthmus. The largest in the left thyroid   lobe measures 2.1 cm in greatest dimension.    CTA BRAIN    Normal flow-related enhancement of the skull base/intracranial internal   carotid arteries.    Normal flow-related enhancement of the bilateral anterior, middle, and   posterior cerebral arteries.    Normal flow-related enhancement of the bilateral intradural vertebral   arteries and the basilar artery.    No flow-limiting stenosis or vascular aneurysm. No AVM.    Venous system is well opacified, no evidence for venous sinus or cortical   vein thrombosis.    IMPRESSION:    CT HEAD:  No hydrocephalus, acute intracranial hemorrhage, mass effect, or brain   edema.    CTA NECK:  No flow-limiting stenosis, evidence for arterial dissection, or vascular   aneurysm.    Multiple hypodensities with peripheral calcifications within the   bilateral lobes of thyroid and isthmus. The largest in the left thyroid   lobe measures 2.1 cm in greatest dimension. Clinical and sonographic   correlation recommended.    CTA BRAIN:  No flow-limiting stenosis or vascular aneurysm. No AVM.    Venous system is well opacified, no evidence for venous sinus or cortical   vein thrombosis.

## 2023-11-12 NOTE — CONSULT NOTE ADULT - SUBJECTIVE AND OBJECTIVE BOX
CARMELINA JASON  [id number]    HISTORY OF PRESENT ILLNESS:  Pt reports 4-5 week history of generalized headache.  she was seen by a dentist and was told that she has a dental infection and treated with Abx.  Symptoms of generalized headache improved but right temporal headache remained. Pain is described as sharp, feels like "screwdriver".  It comes and goes throughtout the day, lasting for 2-3 minutes at a time.  Yesterday, she was not feeling well (dizziness, generalized malaise, weakness).  She was brought to ED by family.  She also reports transient vision loss, curtain go over her eye, that lasted for several hours.  She denies jaw claudication.  She has neck pain.   no fevers or chills  she has no limb girdle pain  Pt states that she has not had any headaches since yesterday.   she was noted to have elevated inflammatory markers and started on pulse dose steroids by Optho.     PAST MEDICAL & SURGICAL HISTORY:        Hypertension      Hyperlipidemia      Atrial fibrillation      Diabetes      No significant past surgical history      Review of Systems: as per HPI, all others negative    MEDICATIONS  (STANDING):  atorvastatin 10 milliGRAM(s) Oral at bedtime  dextrose 5%. 1000 milliLiter(s) (50 mL/Hr) IV Continuous <Continuous>  dextrose 5%. 1000 milliLiter(s) (100 mL/Hr) IV Continuous <Continuous>  dextrose 50% Injectable 25 Gram(s) IV Push once  dextrose 50% Injectable 12.5 Gram(s) IV Push once  dextrose 50% Injectable 25 Gram(s) IV Push once  enoxaparin Injectable 80 milliGRAM(s) SubCutaneous <User Schedule>  glucagon  Injectable 1 milliGRAM(s) IntraMuscular once  insulin glargine Injectable (LANTUS) 25 Unit(s) SubCutaneous at bedtime  insulin lispro (ADMELOG) corrective regimen sliding scale   SubCutaneous three times a day before meals  insulin lispro Injectable (ADMELOG) 8 Unit(s) SubCutaneous three times a day before meals  levothyroxine 100 MICROGram(s) Oral daily  lisinopril 20 milliGRAM(s) Oral daily  metoprolol tartrate 100 milliGRAM(s) Oral two times a day  pantoprazole    Tablet 40 milliGRAM(s) Oral before breakfast    MEDICATIONS  (PRN):  dextrose Oral Gel 15 Gram(s) Oral once PRN Blood Glucose LESS THAN 70 milliGRAM(s)/deciliter      Allergies: orutis      PERTINENT MEDICATION HISTORY:    SOCIAL HISTORY: no toxic habits.     FAMILY HISTORY:  No pertinent family history in first degree relatives        Vital Signs Last 24 Hrs  T(C): 36.8 (12 Nov 2023 14:52), Max: 36.9 (12 Nov 2023 11:08)  T(F): 98.2 (12 Nov 2023 14:52), Max: 98.4 (12 Nov 2023 11:08)  HR: 80 (12 Nov 2023 14:52) (80 - 88)  BP: 109/69 (12 Nov 2023 14:52) (109/69 - 123/73)  BP(mean): 78 (12 Nov 2023 07:20) (78 - 90)  RR: 16 (12 Nov 2023 14:52) (16 - 18)  SpO2: 98% (12 Nov 2023 14:52) (95% - 98%)    Parameters below as of 12 Nov 2023 14:52  Patient On (Oxygen Delivery Method): room air        Physical Exam:  General: No apparent distress  HEENT: EOMI, MMM  Vascular: decrease pulse on the right temple; strong pulse left temple  MSK: OA changes; no synovitis  Neuro: AAOx3  Skin: no visible rashes    LABS:                        12.1   7.97  )-----------( 309      ( 12 Nov 2023 06:26 )             37.0     11-12    139  |  104  |  19  ----------------------------<  312<H>  4.8   |  24  |  0.74    Ca    9.0      12 Nov 2023 06:26    TPro  6.9  /  Alb  3.5  /  TBili  0.2  /  DBili  x   /  AST  12  /  ALT  11  /  AlkPhos  96  11-12    PT/INR - ( 12 Nov 2023 06:33 )   PT: 13.0 sec;   INR: 1.19 ratio      Sedimentation Rate, Erythrocyte (11.12.23 @ 06:26)   Sedimentation Rate, Erythrocyte: 76 mm/hr       PTT - ( 12 Nov 2023 06:33 )  PTT:30.3 sec  Urinalysis Basic - ( 12 Nov 2023 06:26 )    RADIOLOGY & ADDITIONAL STUDIES:    CT HEAD:  No hydrocephalus, acute intracranial hemorrhage, mass effect, or brain   edema.    CTA NECK:  No flow-limiting stenosis, evidence for arterial dissection, or vascular   aneurysm.    Multiple hypodensities with peripheral calcifications within the   bilateral lobes of thyroid and isthmus. The largest in the left thyroid   lobe measures 2.1 cm in greatest dimension. Clinical and sonographic   correlation recommended.    CTA BRAIN:  No flow-limiting stenosis or vascular aneurysm. No AVM.    Venous system is well opacified, no evidence for venous sinus or cortical   vein thrombosis.

## 2023-11-12 NOTE — ED PROVIDER NOTE - CLINICAL SUMMARY MEDICAL DECISION MAKING FREE TEXT BOX
78-year-old female past medical history A-fib on eiliquis, hypertension, hyperlipidemia, diabetes presents as transfer from [facility] for ophthalmology evaluation.  Patient complains of headache right temporal region for the last 3 weeks as well as dizziness.  She had a brief episode of a 30-minute partial vision loss on the right side, lasted between 130 to 2 PM.  Patient was evaluated in the ED 5 days ago CT scan performed which did not show signs of intracranial bleed at that time.  She describes today's vision loss is occurred in coming out from the right side of her eye.  On arrival to [facility] patient does not complain of vision loss or headache.  She denies chest pain and shortness of breath.  Labs at [facility] indicate ESR elevated.  She was given IV steroids and transferred to [facility]. Vitals stable on arrival. PE significant for 20/30 R and L eye, 20/20 B/L eyes. Neuro exam does not indicate motor or sensory deficits, CNII-XII intact., Optho consulted due to concern for GCA. Will await optho recs. 78-year-old female past medical history A-fib on eiliquis, hypertension, hyperlipidemia, diabetes presents as transfer from [facility] for ophthalmology evaluation.  Patient complains of headache right temporal region for the last 3 weeks as well as dizziness.  She had a brief episode of a 30-minute partial vision loss on the right side, lasted between 130 to 2 PM.  Patient was evaluated in the ED 5 days ago CT scan performed which did not show signs of intracranial bleed at that time.  She describes today's vision loss as grey film covering her right side of her eye.  On arrival to [facility] patient does not complain of vision loss or headache.  She denies chest pain and shortness of breath.  Labs at [facility] indicate ESR elevated.  She was given IV steroids and transferred to [facility]. Vitals stable on arrival. PE significant for 20/30 R and L eye, 20/20 B/L eyes. Neuro exam does not indicate motor or sensory deficits, CNII-XII intact., Optho consulted due to concern for GCA. Will await optho recs.

## 2023-11-12 NOTE — ED ADULT NURSE REASSESSMENT NOTE - NS ED NURSE REASSESS COMMENT FT1
Report received from KADE Anderson. Pt A&Ox4, breathing spontaneously and unlabored on room air with even respirations, moving all extremities easily. Pt states she feels the same as she did on arrival, declines any new onset headaches, dizziness, N/V at this time. Appropriate safety measures in place, stretcher locked in lowest position. Pt awaiting CT & MRI

## 2023-11-12 NOTE — CONSULT NOTE ADULT - ASSESSMENT
CARMELINA JASON is a 78y (1945) woman with a PMHx significant for T2DM, HTN, HLD, hypothyroidism, AFib and bilateral cataract surgery presents after one episode of transient right eye vision loss, 3 weeks of episodic headaches, found to have elevated ESR and CRP; given IV solumedrol in ED due to concern for GCA,     Impression:    Transient vision loss of right eye, appearring to have a curtain like grey haze lasting about 10-15 minutes, then spontaneously resolved per patient with concern for amaurosis fugax. Possibly 2/2 GCA as patient found to have elevated ESR/CRP and endorsed temporal tenderness and headaches. Will also need to evaluate for potential vascular etiology. Less likely 2/2 intraorbital pathology. Patient denies any visual deficit, headache, or temporal/ scalp tenderness at present.      Recommendations:    [] ESR 50, CRP 23 reviewed  [] Follow results of CTA Head/ Neck  [] MRI brain and orbits w and w/o  [] s/p IV 1g solumedrol given in ED  [] Appreciate opthalmology consult      Case to be seen and discussed with Neurology attending Dr. Haider, please await attending attestation. Will also discuss case with Stroke Fellow Michael Peralta.  CRAMELINA JASON is a 78y (1945) woman with a PMHx significant for T2DM, HTN, HLD, hypothyroidism, AFib and bilateral cataract surgery presents after one episode of transient right eye vision loss, 3 weeks of episodic headaches, found to have elevated ESR and CRP; given IV solumedrol in ED due to concern for GCA,     Impression:    Transient vision loss of right eye, appearing to have a curtain like grey haze lasting about 10-15 minutes, then spontaneously resolved per patient with concern for amaurosis fugax. Possibly 2/2 GCA as patient found to have elevated ESR/CRP and endorsed temporal tenderness and headaches. Will also need to evaluate for potential vascular etiology. Less likely 2/2 intraorbital pathology. Patient denies any visual deficit, headache, or temporal/ scalp tenderness at present.      Recommendations:    [] ESR 50, CRP 23 reviewed  [] Follow results of CTA Head/ Neck  [] MRI brain and orbits w and w/o  [] s/p IV 1g solumedrol given in ED  [] Appreciate opthalmology consult      Case to be seen and discussed with Neurology attending Dr. Haider, please await attending attestation. Will also discuss case with Stroke Fellow Michael Peralta.  CARMELINA JASON is a 78y (1945) woman with a PMHx significant for T2DM, HTN, HLD, hypothyroidism, AFib and bilateral cataract surgery presents after one episode of transient right eye vision loss, 3 weeks of episodic headaches, found to have elevated ESR and CRP; given IV solumedrol in ED due to concern for GCA,     Impression:    Transient vision loss of right eye, appearing to have a curtain like grey haze lasting about 10-15 minutes, then spontaneously resolved per patient with concern for amaurosis fugax. Possibly 2/2 GCA as patient found to have elevated ESR/CRP, endorsed temporal tenderness, headaches, and jaw pain. Will also need to evaluate for potential vascular etiology. Less likely 2/2 intraorbital pathology. Patient denies any visual deficit, headache, or temporal/ scalp tenderness at present.      Recommendations:    [x] ESR 50, CRP 23 reviewed  [x] CT head non-con, CTA Head/ Neck reviwed  [] MRI brain and orbits w and w/o  [] s/p IV 1g solumedrol given in ED. Plan for at least 3 days of IV solumedrol 1g/day or per rheumatology recommendations  [] Appreciate opthalmology consult  [] Please consult rheumatology for further evaluation due to concern for GCA  [] Patient will benefit from Temporal artery biopsy      Case seen and discussed with Neurology attending Dr. Haider, please await attending attestation. Will also discuss case with Stroke Fellow Michael Peralta.

## 2023-11-12 NOTE — PATIENT PROFILE ADULT - FALL HARM RISK - HARM RISK INTERVENTIONS

## 2023-11-12 NOTE — CONSULT NOTE ADULT - ASSESSMENT
#Transient vision loss, right eye  #Intermittent right temporal headache  #Neck pain   #Elevated ESR    Differential diagnosis includes GCA amaurosis fugax vs TIA  Neurology evaluation to exclude TIA  Agree with steroids  If improvement in headache, vascular consult for right temporal artery biopsy.       Dr. Mary Waters  Rheumatology Attending  [facility]  Rheumatology at [place]     Contact:   call the office:: [phone] or reach [place] Microsoft Teams,

## 2023-11-12 NOTE — CONSULT NOTE ADULT - ASSESSMENT
Assessment  DMT2: 78y Female with DM T2 with hyperglycemia admitted with ? temporal arteritis, on high dose steroids, ? A1C is , on insulin coverage, blood sugars are running high,  no hypoglycemic episode,  eating meals, compliant with low carb diet.  Temporal arteritis: Being worked up, on high dose steroids, monitored.  HTN: On antihypertensive medications, monitored, asymptomatic.      Luis Nunez MD  Cell:  [phone]  Office: [phone]

## 2023-11-12 NOTE — H&P ADULT - HISTORY OF PRESENT ILLNESS
79yo woman with a PMHx of T2DM, HTN, HLD, hypothyroidism, AFib and bilateral cataract surgery presents with 1 episode of transient vision loss and 3 weeks of episodic headaches.   Pt reported on 11/11 at around 2PM to [facility] ER after she noticed her right eye vision was decreased as if a gray film was over her vision for 10-15 minutes.   Denied pain, flashes, curtain over vision or diplopia when assessed by Ophthalmology. Reports this episode resolved without intervention and that vision is now back to baseline. Denies head or eye trauma. Denies prior similar episodes. Also reports she woke up 11/11 with lightheadedness and fatigue. Reports chronic floaters that are unchanged. Pt also reports she has had 3 weeks of episodic headaches that are generalized however with prominence in bilateral temporal areas, reports that she feels whole scalp tenderness when the headache occurs, also has tenderness when palpating bilateral temporal areas. She denied jaw claudication, fevers, chills, or significant joint pains.   At  [facility] ESR was found to be 50, pt received 60mg IV solumedrol. CTH noncon was wnl. Pt then transferred to [facility] for ophthalmology evaluation. on 11/12 received 1g IV solumedrol. CRP 23.  Patient reports that her vision is at baseline now, denies any visual deficit, headache, or temporal/ scalp tenderness at present.  This morning patient reports that her symptoms may be dental related and does endorse jaw pain. Denies any joint pain.   Seen by Neuro and optho in the ED, rheum called 79yo woman with a PMHx of T2DM, HTN, HLD, hypothyroidism, AFib on ELiquis, last dose in am of 11/11 and bilateral cataract surgery presents with 1 episode of transient vision loss and 3 weeks of episodic headaches.   Pt reported on 11/11 at around 2PM to [facility] ER after she noticed her right eye vision was decreased as if a gray film was over her vision for 10-15 minutes.   Denied pain, flashes, curtain over vision or diplopia when assessed by Ophthalmology. Reports this episode resolved without intervention and that vision is now back to baseline. Denies head or eye trauma. Denies prior similar episodes. Also reports she woke up 11/11 with lightheadedness and fatigue. Reports chronic floaters that are unchanged. Pt also reports she has had 3 weeks of episodic headaches that are generalized however with prominence in bilateral temporal areas, reports that she feels whole scalp tenderness when the headache occurs, also has tenderness when palpating bilateral temporal areas. She denied jaw claudication, fevers, chills, or significant joint pains.   At  [facility] ESR was found to be 50, pt received 60mg IV solumedrol. CTH noncon was wnl. Pt then transferred to [facility] for ophthalmology evaluation. on 11/12 received 1g IV solumedrol. CRP 23.  Patient reports that her vision is at baseline now, denies any visual deficit, headache, or temporal/ scalp tenderness at present.  This morning patient reports that her symptoms may be dental related and does endorse jaw pain. Denies any joint pain.   Seen by Neuro and optho in the ED, rheum called

## 2023-11-13 ENCOUNTER — TRANSCRIPTION ENCOUNTER (OUTPATIENT)
Age: 78
End: 2023-11-13

## 2023-11-13 DIAGNOSIS — H54.7 UNSPECIFIED VISUAL LOSS: ICD-10-CM

## 2023-11-13 LAB
A1C WITH ESTIMATED AVERAGE GLUCOSE RESULT: 7.3 % — HIGH (ref 4–5.6)
A1C WITH ESTIMATED AVERAGE GLUCOSE RESULT: 7.3 % — HIGH (ref 4–5.6)
ANION GAP SERPL CALC-SCNC: 11 MMOL/L — SIGNIFICANT CHANGE UP (ref 5–17)
ANION GAP SERPL CALC-SCNC: 11 MMOL/L — SIGNIFICANT CHANGE UP (ref 5–17)
APTT BLD: 26.8 SEC — SIGNIFICANT CHANGE UP (ref 24.5–35.6)
APTT BLD: 26.8 SEC — SIGNIFICANT CHANGE UP (ref 24.5–35.6)
BUN SERPL-MCNC: 22 MG/DL — SIGNIFICANT CHANGE UP (ref 7–23)
BUN SERPL-MCNC: 22 MG/DL — SIGNIFICANT CHANGE UP (ref 7–23)
CALCIUM SERPL-MCNC: 8.9 MG/DL — SIGNIFICANT CHANGE UP (ref 8.4–10.5)
CALCIUM SERPL-MCNC: 8.9 MG/DL — SIGNIFICANT CHANGE UP (ref 8.4–10.5)
CHLORIDE SERPL-SCNC: 105 MMOL/L — SIGNIFICANT CHANGE UP (ref 96–108)
CHLORIDE SERPL-SCNC: 105 MMOL/L — SIGNIFICANT CHANGE UP (ref 96–108)
CHOLEST SERPL-MCNC: 106 MG/DL — SIGNIFICANT CHANGE UP
CHOLEST SERPL-MCNC: 106 MG/DL — SIGNIFICANT CHANGE UP
CK SERPL-CCNC: 270 U/L — HIGH (ref 25–170)
CK SERPL-CCNC: 270 U/L — HIGH (ref 25–170)
CO2 SERPL-SCNC: 22 MMOL/L — SIGNIFICANT CHANGE UP (ref 22–31)
CO2 SERPL-SCNC: 22 MMOL/L — SIGNIFICANT CHANGE UP (ref 22–31)
CREAT SERPL-MCNC: 0.72 MG/DL — SIGNIFICANT CHANGE UP (ref 0.5–1.3)
CREAT SERPL-MCNC: 0.72 MG/DL — SIGNIFICANT CHANGE UP (ref 0.5–1.3)
EGFR: 87 ML/MIN/1.73M2 — SIGNIFICANT CHANGE UP
EGFR: 87 ML/MIN/1.73M2 — SIGNIFICANT CHANGE UP
ESTIMATED AVERAGE GLUCOSE: 163 MG/DL — HIGH (ref 68–114)
ESTIMATED AVERAGE GLUCOSE: 163 MG/DL — HIGH (ref 68–114)
FERRITIN SERPL-MCNC: 161 NG/ML — SIGNIFICANT CHANGE UP (ref 13–330)
FERRITIN SERPL-MCNC: 161 NG/ML — SIGNIFICANT CHANGE UP (ref 13–330)
GLUCOSE BLDC GLUCOMTR-MCNC: 286 MG/DL — HIGH (ref 70–99)
GLUCOSE BLDC GLUCOMTR-MCNC: 286 MG/DL — HIGH (ref 70–99)
GLUCOSE BLDC GLUCOMTR-MCNC: 318 MG/DL — HIGH (ref 70–99)
GLUCOSE BLDC GLUCOMTR-MCNC: 318 MG/DL — HIGH (ref 70–99)
GLUCOSE BLDC GLUCOMTR-MCNC: 364 MG/DL — HIGH (ref 70–99)
GLUCOSE BLDC GLUCOMTR-MCNC: 364 MG/DL — HIGH (ref 70–99)
GLUCOSE BLDC GLUCOMTR-MCNC: 412 MG/DL — HIGH (ref 70–99)
GLUCOSE BLDC GLUCOMTR-MCNC: 412 MG/DL — HIGH (ref 70–99)
GLUCOSE SERPL-MCNC: 307 MG/DL — HIGH (ref 70–99)
GLUCOSE SERPL-MCNC: 307 MG/DL — HIGH (ref 70–99)
HCT VFR BLD CALC: 31.6 % — LOW (ref 34.5–45)
HCT VFR BLD CALC: 31.6 % — LOW (ref 34.5–45)
HCV AB S/CO SERPL IA: 0.07 S/CO — SIGNIFICANT CHANGE UP (ref 0–0.99)
HCV AB S/CO SERPL IA: 0.07 S/CO — SIGNIFICANT CHANGE UP (ref 0–0.99)
HCV AB SERPL-IMP: SIGNIFICANT CHANGE UP
HCV AB SERPL-IMP: SIGNIFICANT CHANGE UP
HDLC SERPL-MCNC: 45 MG/DL — LOW
HDLC SERPL-MCNC: 45 MG/DL — LOW
HGB BLD-MCNC: 10.8 G/DL — LOW (ref 11.5–15.5)
HGB BLD-MCNC: 10.8 G/DL — LOW (ref 11.5–15.5)
INR BLD: 1.17 RATIO — SIGNIFICANT CHANGE UP (ref 0.85–1.18)
INR BLD: 1.17 RATIO — SIGNIFICANT CHANGE UP (ref 0.85–1.18)
IRON SATN MFR SERPL: 73 UG/DL — SIGNIFICANT CHANGE UP (ref 30–160)
IRON SATN MFR SERPL: 73 UG/DL — SIGNIFICANT CHANGE UP (ref 30–160)
LIPID PNL WITH DIRECT LDL SERPL: 45 MG/DL — SIGNIFICANT CHANGE UP
LIPID PNL WITH DIRECT LDL SERPL: 45 MG/DL — SIGNIFICANT CHANGE UP
MCHC RBC-ENTMCNC: 29 PG — SIGNIFICANT CHANGE UP (ref 27–34)
MCHC RBC-ENTMCNC: 29 PG — SIGNIFICANT CHANGE UP (ref 27–34)
MCHC RBC-ENTMCNC: 34.2 GM/DL — SIGNIFICANT CHANGE UP (ref 32–36)
MCHC RBC-ENTMCNC: 34.2 GM/DL — SIGNIFICANT CHANGE UP (ref 32–36)
MCV RBC AUTO: 84.9 FL — SIGNIFICANT CHANGE UP (ref 80–100)
MCV RBC AUTO: 84.9 FL — SIGNIFICANT CHANGE UP (ref 80–100)
NON HDL CHOLESTEROL: 61 MG/DL — SIGNIFICANT CHANGE UP
NON HDL CHOLESTEROL: 61 MG/DL — SIGNIFICANT CHANGE UP
NRBC # BLD: 0 /100 WBCS — SIGNIFICANT CHANGE UP (ref 0–0)
NRBC # BLD: 0 /100 WBCS — SIGNIFICANT CHANGE UP (ref 0–0)
PLATELET # BLD AUTO: 320 K/UL — SIGNIFICANT CHANGE UP (ref 150–400)
PLATELET # BLD AUTO: 320 K/UL — SIGNIFICANT CHANGE UP (ref 150–400)
POTASSIUM SERPL-MCNC: 4 MMOL/L — SIGNIFICANT CHANGE UP (ref 3.5–5.3)
POTASSIUM SERPL-MCNC: 4 MMOL/L — SIGNIFICANT CHANGE UP (ref 3.5–5.3)
POTASSIUM SERPL-SCNC: 4 MMOL/L — SIGNIFICANT CHANGE UP (ref 3.5–5.3)
POTASSIUM SERPL-SCNC: 4 MMOL/L — SIGNIFICANT CHANGE UP (ref 3.5–5.3)
PROTHROM AB SERPL-ACNC: 12.2 SEC — SIGNIFICANT CHANGE UP (ref 9.5–13)
PROTHROM AB SERPL-ACNC: 12.2 SEC — SIGNIFICANT CHANGE UP (ref 9.5–13)
RBC # BLD: 3.72 M/UL — LOW (ref 3.8–5.2)
RBC # BLD: 3.72 M/UL — LOW (ref 3.8–5.2)
RBC # FLD: 12.8 % — SIGNIFICANT CHANGE UP (ref 10.3–14.5)
RBC # FLD: 12.8 % — SIGNIFICANT CHANGE UP (ref 10.3–14.5)
SODIUM SERPL-SCNC: 138 MMOL/L — SIGNIFICANT CHANGE UP (ref 135–145)
SODIUM SERPL-SCNC: 138 MMOL/L — SIGNIFICANT CHANGE UP (ref 135–145)
T3 SERPL-MCNC: 50 NG/DL — LOW (ref 80–200)
T3 SERPL-MCNC: 50 NG/DL — LOW (ref 80–200)
T4 AB SER-ACNC: 6 UG/DL — SIGNIFICANT CHANGE UP (ref 4.6–12)
T4 AB SER-ACNC: 6 UG/DL — SIGNIFICANT CHANGE UP (ref 4.6–12)
T4 FREE SERPL-MCNC: 1.3 NG/DL — SIGNIFICANT CHANGE UP (ref 0.9–1.8)
T4 FREE SERPL-MCNC: 1.3 NG/DL — SIGNIFICANT CHANGE UP (ref 0.9–1.8)
TRIGL SERPL-MCNC: 78 MG/DL — SIGNIFICANT CHANGE UP
TRIGL SERPL-MCNC: 78 MG/DL — SIGNIFICANT CHANGE UP
TSH SERPL-MCNC: 0.12 UIU/ML — LOW (ref 0.27–4.2)
WBC # BLD: 15.91 K/UL — HIGH (ref 3.8–10.5)
WBC # BLD: 15.91 K/UL — HIGH (ref 3.8–10.5)
WBC # FLD AUTO: 15.91 K/UL — HIGH (ref 3.8–10.5)
WBC # FLD AUTO: 15.91 K/UL — HIGH (ref 3.8–10.5)

## 2023-11-13 PROCEDURE — 76376 3D RENDER W/INTRP POSTPROCES: CPT | Mod: 26

## 2023-11-13 PROCEDURE — 93306 TTE W/DOPPLER COMPLETE: CPT | Mod: 26

## 2023-11-13 PROCEDURE — 93356 MYOCRD STRAIN IMG SPCKL TRCK: CPT

## 2023-11-13 PROCEDURE — 99232 SBSQ HOSP IP/OBS MODERATE 35: CPT

## 2023-11-13 PROCEDURE — 93882 EXTRACRANIAL UNI/LTD STUDY: CPT | Mod: 26

## 2023-11-13 PROCEDURE — 93998 UNLISTD NONINVAS VASC DX STD: CPT | Mod: 26

## 2023-11-13 RX ADMIN — Medication 100 MILLIGRAM(S): at 06:11

## 2023-11-13 RX ADMIN — INSULIN GLARGINE 25 UNIT(S): 100 INJECTION, SOLUTION SUBCUTANEOUS at 22:27

## 2023-11-13 RX ADMIN — ATORVASTATIN CALCIUM 10 MILLIGRAM(S): 80 TABLET, FILM COATED ORAL at 22:26

## 2023-11-13 RX ADMIN — Medication 6: at 18:05

## 2023-11-13 RX ADMIN — Medication 100 MICROGRAM(S): at 06:11

## 2023-11-13 RX ADMIN — Medication 4: at 12:24

## 2023-11-13 RX ADMIN — Medication 3: at 22:27

## 2023-11-13 RX ADMIN — PANTOPRAZOLE SODIUM 40 MILLIGRAM(S): 20 TABLET, DELAYED RELEASE ORAL at 06:11

## 2023-11-13 RX ADMIN — Medication 100 MILLIGRAM(S): at 18:04

## 2023-11-13 RX ADMIN — Medication 3: at 08:25

## 2023-11-13 RX ADMIN — Medication 50 MILLIGRAM(S): at 06:11

## 2023-11-13 RX ADMIN — LISINOPRIL 20 MILLIGRAM(S): 2.5 TABLET ORAL at 06:10

## 2023-11-13 NOTE — CONSULT NOTE ADULT - PROBLEM SELECTOR RECOMMENDATION 3
RISS
On medications,  no chest pain, stable, monitored and followed up by primary  team/cardiology team

## 2023-11-13 NOTE — CONSULT NOTE ADULT - SUBJECTIVE AND OBJECTIVE BOX
CHIEF COMPLAINT:    HISTORY OF PRESENT ILLNESS:  77yo woman with a PMHx of T2DM, HTN, HLD, hypothyroidism, AFib on ELiquis, last dose in am of 11/11 and bilateral cataract surgery presents with 1 episode of transient vision loss and 3 weeks of episodic headaches.   Pt reported on 11/11 at around 2PM to [facility] ER after she noticed her right eye vision was decreased as if a gray film was over her vision for 10-15 minutes.   Denied pain, flashes, curtain over vision or diplopia when assessed by Ophthalmology. Reports this episode resolved without intervention and that vision is now back to baseline. Denies head or eye trauma. Denies prior similar episodes. Also reports she woke up 11/11 with lightheadedness and fatigue. Reports chronic floaters that are unchanged. Pt also reports she has had 3 weeks of episodic headaches that are generalized however with prominence in bilateral temporal areas, reports that she feels whole scalp tenderness when the headache occurs, also has tenderness when palpating bilateral temporal areas. She denied jaw claudication, fevers, chills, or significant joint pains.     At [facility] ESR was found to be 50, pt received 60mg IV solumedrol. CTH noncon was wnl. Pt then transferred to [facility] for ophthalmology evaluation. on 11/12 received 1g IV solumedrol. CRP 23.  Patient reports that her vision is at baseline now, denies any visual deficit, headache, or temporal/ scalp tenderness at present.  This morning patient reports that her symptoms may be dental related and does endorse jaw pain. Denies any joint pain.   Seen by Neuro and optho in the ED, rheum called  Plan for temporal artery biopsy today.   NO chest pain or shortness of breath   states she sees a cardiologist in [place]       PAST MEDICAL & SURGICAL HISTORY:  Hypertension      Hyperlipidemia      Atrial fibrillation      Diabetes      No significant past surgical history              MEDICATIONS:  lisinopril 20 milliGRAM(s) Oral daily  metoprolol tartrate 100 milliGRAM(s) Oral two times a day          pantoprazole    Tablet 40 milliGRAM(s) Oral before breakfast    atorvastatin 10 milliGRAM(s) Oral at bedtime  dextrose 50% Injectable 25 Gram(s) IV Push once  dextrose 50% Injectable 12.5 Gram(s) IV Push once  dextrose 50% Injectable 25 Gram(s) IV Push once  dextrose Oral Gel 15 Gram(s) Oral once PRN  glucagon  Injectable 1 milliGRAM(s) IntraMuscular once  insulin glargine Injectable (LANTUS) 25 Unit(s) SubCutaneous at bedtime  insulin lispro (ADMELOG) corrective regimen sliding scale   SubCutaneous three times a day before meals  insulin lispro (ADMELOG) corrective regimen sliding scale   SubCutaneous at bedtime  insulin lispro Injectable (ADMELOG) 8 Unit(s) SubCutaneous three times a day before meals  levothyroxine 100 MICROGram(s) Oral daily  methylPREDNISolone sodium succinate IVPB 1000 milliGRAM(s) IV Intermittent daily    dextrose 5%. 1000 milliLiter(s) IV Continuous <Continuous>  dextrose 5%. 1000 milliLiter(s) IV Continuous <Continuous>  influenza  Vaccine (HIGH DOSE) 0.7 milliLiter(s) IntraMuscular once      FAMILY HISTORY:  No pertinent family history in first degree relatives        SOCIAL HISTORY:    [ ] Non-smoker  [ ] Smoker  [ ] Alcohol    Allergies    No Known Allergies    Intolerances    	    REVIEW OF SYSTEMS:  CONSTITUTIONAL: No fever, weight loss, + fatigue  EYES: No eye pain, ++visual disturbances, or discharge  ENMT:  No difficulty hearing, tinnitus, vertigo; No sinus or throat pain  NECK: No pain or stiffness  RESPIRATORY: No cough, wheezing, chills or hemoptysis; No Shortness of Breath  CARDIOVASCULAR: No chest pain, palpitations, passing out, dizziness, or leg swelling  GASTROINTESTINAL: No abdominal or epigastric pain. No nausea, vomiting, or hematemesis; No diarrhea or constipation. No melena or hematochezia.  GENITOURINARY: No dysuria, frequency, hematuria, or incontinence  NEUROLOGICAL: No headaches, memory loss, loss of strength, numbness, or tremors  SKIN: No itching, burning, rashes, or lesions   LYMPH Nodes: No enlarged glands  ENDOCRINE: No heat or cold intolerance; No hair loss  MUSCULOSKELETAL: No joint pain or swelling; No muscle, back, or extremity pain  PSYCHIATRIC: No depression, anxiety, mood swings, or difficulty sleeping  HEME/LYMPH: No easy bruising, or bleeding gums  ALLERY AND IMMUNOLOGIC: No hives or eczema	    [ ] All others negative	  [ ] Unable to obtain    PHYSICAL EXAM:  T(C): 36.6 (11-13-23 @ 05:30), Max: 36.9 (11-12-23 @ 11:08)  HR: 65 (11-13-23 @ 05:30) (65 - 90)  BP: 125/72 (11-13-23 @ 05:30) (109/69 - 136/79)  RR: 18 (11-13-23 @ 05:30) (16 - 18)  SpO2: 96% (11-13-23 @ 05:30) (96% - 98%)  Wt(kg): --  I&O's Summary      Appearance: Normal	  HEENT:   Normal oral mucosa, PERRL, EOMI	  Lymphatic: No lymphadenopathy  Cardiovascular: Normal S1 S2, No JVD, No murmurs, No edema  Respiratory: Lungs clear to auscultation	  Psychiatry: A & O x 3, Mood & affect appropriate  Gastrointestinal:  Soft, Non-tender, + BS	  Skin: No rashes, No ecchymoses, No cyanosis	  Neurologic: Non-focal  Extremities: Normal range of motion, No clubbing, cyanosis or edema  Vascular: Peripheral pulses palpable 2+ bilaterally    TELEMETRY: 	    ECG:  	  RADIOLOGY:  c< from: CT Angio Neck w/ IV Cont (11.12.23 @ 10:11) >    ACC: [id number] EXAM:  CT ANGIO BRAIN (W)AW IC   ORDERED BY: VARSHA LANDON     ACC: [id number] EXAM:  CT ANGIO NECK (W)AW IC   ORDERED BY: VARSHA LANDON     PROCEDURE DATE:  11/12/2023          INTERPRETATION:  HISTORY: Right vision loss and headache    COMPARISON: CT head 11/11/2023    TECHNIQUE: Noncontrast CT head and CT angiogram of the neck and brain was   performed after administration of intravenous contrast. MIP and 3D   reconstructions were performed.    Total of 70 cc Omnipaque 300 intravenous contrast were administered   without complication.    FINDINGS:    CT HEAD:    There is no acute intracranial mass-effect, hemorrhage, midline shift, or   abnormal extra-axial fluid collection.    Ventricles, sulci, and cisterns are normal in size for the patient's age   without hydrocephalus. Basal cisterns are patent.    Intraorbital contents unremarkable.    Visualized paranasal sinuses and mastoid air cells are clear.    Calvarium is intact.    CTA NECK    Normal flow-related enhancement of the bilateral common and internal   carotid arteries.    Normal flow-related enhancement of the bilateral vertebral arteries.    No flow-limiting stenosis, evidence for arterial dissection, or vascular   aneurysm.    Multiple hypodensities with peripheral calcifications within the   bilateral lobes of thyroid and isthmus. The largest in the left thyroid   lobe measures 2.1 cm in greatest dimension.    CTA BRAIN    Normal flow-related enhancement of the skull base/intracranial internal   carotid arteries.    Normal flow-related enhancement of the bilateral anterior, middle, and   posterior cerebral arteries.    Normal flow-related enhancement of the bilateral intradural vertebral   arteries and the basilar artery.    No flow-limiting stenosis or vascular aneurysm. No AVM.    Venous system is well opacified, no evidence for venous sinus or cortical   vein thrombosis.    IMPRESSION:    CT HEAD:  No hydrocephalus, acute intracranial hemorrhage, mass effect, or brain   edema.    CTA NECK:  No flow-limiting stenosis, evidence for arterial dissection, or vascular   aneurysm.    Multiple hypodensities with peripheral calcifications within the   bilateral lobes of thyroid and isthmus. The largest in the left thyroid   lobe measures 2.1 cm in greatest dimension. Clinical and sonographic   correlation recommended.    CTA BRAIN:  No flow-limiting stenosis or vascular aneurysm. No AVM.    Venous system is well opacified, no evidence for venous sinus or cortical   vein thrombosis.    --- End of Report ---           CORINA CARDONA MD; Resident Radiologist  This document has been electronically signed.  JAVIER NIX MD; Attending Radiologist  This document has been electronically signed. Nov 12 2023 10:54AM    < end of copied text >    OTHER: 	  	  LABS:	 	    CARDIAC MARKERS:                                  10.8   15.91 )-----------( 320      ( 13 Nov 2023 05:45 )             31.6     11-13    138  |  105  |  22  ----------------------------<  307<H>  4.0   |  22  |  0.72    Ca    8.9      13 Nov 2023 05:45    TPro  6.9  /  Alb  3.5  /  TBili  0.2  /  DBili  x   /  AST  12  /  ALT  11  /  AlkPhos  96  11-12    proBNP:   Lipid Profile:   HgA1c:   TSH:

## 2023-11-13 NOTE — CONSULT NOTE ADULT - ASSESSMENT
79yo woman with a PMHx of T2DM, HTN, HLD, hypothyroidism, AFib on ELiquis, last dose in am of 11/11 and bilateral cataract surgery presents with 1 episode of transient vision loss and 3 weeks of episodic headaches  Plan for temporal artery biopsy today.   NO chest pain or shortness of breath   states she sees a cardiologist in [place]

## 2023-11-13 NOTE — PROGRESS NOTE ADULT - SUBJECTIVE AND OBJECTIVE BOX
Patient is a 78y old  Female who presents with a chief complaint of     SUBJECTIVE / OVERNIGHT EVENTS: ptn's HA and diplopia had resolved, awaiting TA biopsy today, leukocytosis 2/2 steroids    MEDICATIONS  (STANDING):  atorvastatin 10 milliGRAM(s) Oral at bedtime  dextrose 5%. 1000 milliLiter(s) (50 mL/Hr) IV Continuous <Continuous>  dextrose 5%. 1000 milliLiter(s) (100 mL/Hr) IV Continuous <Continuous>  dextrose 50% Injectable 25 Gram(s) IV Push once  dextrose 50% Injectable 12.5 Gram(s) IV Push once  dextrose 50% Injectable 25 Gram(s) IV Push once  glucagon  Injectable 1 milliGRAM(s) IntraMuscular once  influenza  Vaccine (HIGH DOSE) 0.7 milliLiter(s) IntraMuscular once  insulin glargine Injectable (LANTUS) 25 Unit(s) SubCutaneous at bedtime  insulin lispro (ADMELOG) corrective regimen sliding scale   SubCutaneous three times a day before meals  insulin lispro (ADMELOG) corrective regimen sliding scale   SubCutaneous at bedtime  insulin lispro Injectable (ADMELOG) 8 Unit(s) SubCutaneous three times a day before meals  levothyroxine 100 MICROGram(s) Oral daily  lisinopril 20 milliGRAM(s) Oral daily  methylPREDNISolone sodium succinate IVPB 1000 milliGRAM(s) IV Intermittent daily  metoprolol tartrate 100 milliGRAM(s) Oral two times a day  pantoprazole    Tablet 40 milliGRAM(s) Oral before breakfast    MEDICATIONS  (PRN):  dextrose Oral Gel 15 Gram(s) Oral once PRN Blood Glucose LESS THAN 70 milliGRAM(s)/deciliter      Vital Signs Last 24 Hrs  T(F): 97.9 (11-13-23 @ 05:30), Max: 98.4 (11-12-23 @ 11:08)  HR: 65 (11-13-23 @ 05:30) (65 - 90)  BP: 125/72 (11-13-23 @ 05:30) (109/69 - 136/79)  RR: 18 (11-13-23 @ 05:30) (16 - 18)  SpO2: 96% (11-13-23 @ 05:30) (96% - 98%)  Telemetry:   CAPILLARY BLOOD GLUCOSE      POCT Blood Glucose.: 286 mg/dL (13 Nov 2023 07:56)  POCT Blood Glucose.: 317 mg/dL (12 Nov 2023 21:57)  POCT Blood Glucose.: 445 mg/dL (12 Nov 2023 19:07)  POCT Blood Glucose.: 458 mg/dL (12 Nov 2023 19:05)  POCT Blood Glucose.: 427 mg/dL (12 Nov 2023 18:16)    I&O's Summary      PHYSICAL EXAM:  GENERAL: NAD, well-developed  HEAD:  Atraumatic, Normocephalic  EYES: EOMI, PERRLA, conjunctiva and sclera clear  NECK: Supple, No JVD  CHEST/LUNG: Clear to auscultation bilaterally; No wheeze  HEART: Regular rate and rhythm; No murmurs, rubs, or gallops  ABDOMEN: Soft, Nontender, Nondistended; Bowel sounds present  EXTREMITIES:  2+ Peripheral Pulses, No clubbing, cyanosis, or edema  PSYCH: AAOx3  NEUROLOGY: non-focal  SKIN: No rashes or lesions    LABS:                        10.8   15.91 )-----------( 320      ( 13 Nov 2023 05:45 )             31.6     11-13    138  |  105  |  22  ----------------------------<  307<H>  4.0   |  22  |  0.72    Ca    8.9      13 Nov 2023 05:45    TPro  6.9  /  Alb  3.5  /  TBili  0.2  /  DBili  x   /  AST  12  /  ALT  11  /  AlkPhos  96  11-12    PT/INR - ( 13 Nov 2023 05:45 )   PT: 12.2 sec;   INR: 1.17 ratio         PTT - ( 13 Nov 2023 05:45 )  PTT:26.8 sec  CARDIAC MARKERS ( 13 Nov 2023 05:45 )  x     / x     / 270 U/L / x     / x          Urinalysis Basic - ( 13 Nov 2023 05:45 )    Color: x / Appearance: x / SG: x / pH: x  Gluc: 307 mg/dL / Ketone: x  / Bili: x / Urobili: x   Blood: x / Protein: x / Nitrite: x   Leuk Esterase: x / RBC: x / WBC x   Sq Epi: x / Non Sq Epi: x / Bacteria: x        RADIOLOGY & ADDITIONAL TESTS:    Imaging Personally Reviewed:    Consultant(s) Notes Reviewed:      Care Discussed with Consultants/Other Providers:

## 2023-11-13 NOTE — PROGRESS NOTE ADULT - SUBJECTIVE AND OBJECTIVE BOX
Patient is a 78y old  Female who presents with a chief complaint of     Vascular Surgery Attending Progress Note    Interval HPI: pt  ate lunch  rt ta bx being wale  pt states  some less temple discomfort     Medications:  atorvastatin 10 milliGRAM(s) Oral at bedtime  dextrose 5%. 1000 milliLiter(s) IV Continuous <Continuous>  dextrose 5%. 1000 milliLiter(s) IV Continuous <Continuous>  dextrose 50% Injectable 25 Gram(s) IV Push once  dextrose 50% Injectable 12.5 Gram(s) IV Push once  dextrose 50% Injectable 25 Gram(s) IV Push once  dextrose Oral Gel 15 Gram(s) Oral once PRN  glucagon  Injectable 1 milliGRAM(s) IntraMuscular once  influenza  Vaccine (HIGH DOSE) 0.7 milliLiter(s) IntraMuscular once  insulin glargine Injectable (LANTUS) 25 Unit(s) SubCutaneous at bedtime  insulin lispro (ADMELOG) corrective regimen sliding scale   SubCutaneous three times a day before meals  insulin lispro (ADMELOG) corrective regimen sliding scale   SubCutaneous at bedtime  insulin lispro Injectable (ADMELOG) 8 Unit(s) SubCutaneous three times a day before meals  levothyroxine 100 MICROGram(s) Oral daily  lisinopril 20 milliGRAM(s) Oral daily  methylPREDNISolone sodium succinate IVPB 1000 milliGRAM(s) IV Intermittent daily  metoprolol tartrate 100 milliGRAM(s) Oral two times a day  pantoprazole    Tablet 40 milliGRAM(s) Oral before breakfast      Vital Signs Last 24 Hrs  T(C): 36.8 (13 Nov 2023 13:22), Max: 36.8 (13 Nov 2023 13:22)  T(F): 98.3 (13 Nov 2023 13:22), Max: 98.3 (13 Nov 2023 13:22)  HR: 62 (13 Nov 2023 13:22) (62 - 65)  BP: 127/69 (13 Nov 2023 13:22) (125/72 - 127/69)  BP(mean): --  RR: 18 (13 Nov 2023 13:22) (18 - 18)  SpO2: 93% (13 Nov 2023 13:22) (93% - 96%)    Parameters below as of 13 Nov 2023 13:22  Patient On (Oxygen Delivery Method): room air      I&O's Summary      Physical Exam:  Neuro  A&Ox3 VSS  Vascular:  stable     LABS:                        10.8   15.91 )-----------( 320      ( 13 Nov 2023 05:45 )             31.6     11-13    138  |  105  |  22  ----------------------------<  307<H>  4.0   |  22  |  0.72    Ca    8.9      13 Nov 2023 05:45    TPro  6.9  /  Alb  3.5  /  TBili  0.2  /  DBili  x   /  AST  12  /  ALT  11  /  AlkPhos  96  11-12    PT/INR - ( 13 Nov 2023 05:45 )   PT: 12.2 sec;   INR: 1.17 ratio         PTT - ( 13 Nov 2023 05:45 )  PTT:26.8 sec    PETE SOLARES MD  [phone]

## 2023-11-13 NOTE — CONSULT NOTE ADULT - PROBLEM SELECTOR RECOMMENDATION 2
rate controlled   check thyroid panel
Suggest to continue medications, monitoring, FU primary team recommendations. .

## 2023-11-13 NOTE — PROGRESS NOTE ADULT - SUBJECTIVE AND OBJECTIVE BOX
Chief complaint  Patient is a 78y old  Female who presents with a chief complaint of        Labs and Fingersticks  CAPILLARY BLOOD GLUCOSE      POCT Blood Glucose.: 318 mg/dL (13 Nov 2023 12:24)  POCT Blood Glucose.: 286 mg/dL (13 Nov 2023 07:56)  POCT Blood Glucose.: 317 mg/dL (12 Nov 2023 21:57)  POCT Blood Glucose.: 445 mg/dL (12 Nov 2023 19:07)  POCT Blood Glucose.: 458 mg/dL (12 Nov 2023 19:05)  POCT Blood Glucose.: 427 mg/dL (12 Nov 2023 18:16)      Anion Gap: 11 (11-13 @ 05:45)  Anion Gap: 11 (11-12 @ 06:26)      Calcium: 8.9 (11-13 @ 05:45)  Calcium: 9.0 (11-12 @ 06:26)  Albumin: 3.5 (11-12 @ 06:26)    Alanine Aminotransferase (ALT/SGPT): 11 (11-12 @ 06:26)  Alkaline Phosphatase: 96 (11-12 @ 06:26)  Aspartate Aminotransferase (AST/SGOT): 12 (11-12 @ 06:26)        11-13    138  |  105  |  22  ----------------------------<  307<H>  4.0   |  22  |  0.72    Ca    8.9      13 Nov 2023 05:45    TPro  6.9  /  Alb  3.5  /  TBili  0.2  /  DBili  x   /  AST  12  /  ALT  11  /  AlkPhos  96  11-12                        10.8   15.91 )-----------( 320      ( 13 Nov 2023 05:45 )             31.6     Medications  MEDICATIONS  (STANDING):  atorvastatin 10 milliGRAM(s) Oral at bedtime  dextrose 5%. 1000 milliLiter(s) (50 mL/Hr) IV Continuous <Continuous>  dextrose 5%. 1000 milliLiter(s) (100 mL/Hr) IV Continuous <Continuous>  dextrose 50% Injectable 25 Gram(s) IV Push once  dextrose 50% Injectable 12.5 Gram(s) IV Push once  dextrose 50% Injectable 25 Gram(s) IV Push once  glucagon  Injectable 1 milliGRAM(s) IntraMuscular once  influenza  Vaccine (HIGH DOSE) 0.7 milliLiter(s) IntraMuscular once  insulin glargine Injectable (LANTUS) 25 Unit(s) SubCutaneous at bedtime  insulin lispro (ADMELOG) corrective regimen sliding scale   SubCutaneous three times a day before meals  insulin lispro (ADMELOG) corrective regimen sliding scale   SubCutaneous at bedtime  insulin lispro Injectable (ADMELOG) 8 Unit(s) SubCutaneous three times a day before meals  levothyroxine 100 MICROGram(s) Oral daily  lisinopril 20 milliGRAM(s) Oral daily  methylPREDNISolone sodium succinate IVPB 1000 milliGRAM(s) IV Intermittent daily  metoprolol tartrate 100 milliGRAM(s) Oral two times a day  pantoprazole    Tablet 40 milliGRAM(s) Oral before breakfast      Physical Exam  General: Patient comfortable in bed   Vital Signs Last 12 Hrs  T(F): 98.3 (11-13-23 @ 13:22), Max: 98.3 (11-13-23 @ 13:22)  HR: 62 (11-13-23 @ 13:22) (62 - 65)  BP: 127/69 (11-13-23 @ 13:22) (125/72 - 127/69)  BP(mean): --  RR: 18 (11-13-23 @ 13:22) (18 - 18)  SpO2: 93% (11-13-23 @ 13:22) (93% - 96%)    CVS: S1S2   Respiratory: No wheezing, no crepitations  GI: Abdomen soft, bowel sounds positive  Musculoskeletal:  moves all extremities  : Voiding      Chief complaint  Patient is a 78y old  Female who presents with a chief complaint of        Labs and Fingersticks  CAPILLARY BLOOD GLUCOSE      POCT Blood Glucose.: 318 mg/dL (13 Nov 2023 12:24)  POCT Blood Glucose.: 286 mg/dL (13 Nov 2023 07:56)  POCT Blood Glucose.: 317 mg/dL (12 Nov 2023 21:57)  POCT Blood Glucose.: 445 mg/dL (12 Nov 2023 19:07)  POCT Blood Glucose.: 458 mg/dL (12 Nov 2023 19:05)  POCT Blood Glucose.: 427 mg/dL (12 Nov 2023 18:16)      Anion Gap: 11 (11-13 @ 05:45)  Anion Gap: 11 (11-12 @ 06:26)      Calcium: 8.9 (11-13 @ 05:45)  Calcium: 9.0 (11-12 @ 06:26)  Albumin: 3.5 (11-12 @ 06:26)    Alanine Aminotransferase (ALT/SGPT): 11 (11-12 @ 06:26)  Alkaline Phosphatase: 96 (11-12 @ 06:26)  Aspartate Aminotransferase (AST/SGOT): 12 (11-12 @ 06:26)        11-13    138  |  105  |  22  ----------------------------<  307<H>  4.0   |  22  |  0.72    Ca    8.9      13 Nov 2023 05:45    TPro  6.9  /  Alb  3.5  /  TBili  0.2  /  DBili  x   /  AST  12  /  ALT  11  /  AlkPhos  96  11-12                        10.8   15.91 )-----------( 320      ( 13 Nov 2023 05:45 )             31.6     Medications  MEDICATIONS  (STANDING):  atorvastatin 10 milliGRAM(s) Oral at bedtime  dextrose 5%. 1000 milliLiter(s) (50 mL/Hr) IV Continuous <Continuous>  dextrose 5%. 1000 milliLiter(s) (100 mL/Hr) IV Continuous <Continuous>  dextrose 50% Injectable 25 Gram(s) IV Push once  dextrose 50% Injectable 12.5 Gram(s) IV Push once  dextrose 50% Injectable 25 Gram(s) IV Push once  glucagon  Injectable 1 milliGRAM(s) IntraMuscular once  influenza  Vaccine (HIGH DOSE) 0.7 milliLiter(s) IntraMuscular once  insulin glargine Injectable (LANTUS) 25 Unit(s) SubCutaneous at bedtime  insulin lispro (ADMELOG) corrective regimen sliding scale   SubCutaneous three times a day before meals  insulin lispro (ADMELOG) corrective regimen sliding scale   SubCutaneous at bedtime  insulin lispro Injectable (ADMELOG) 8 Unit(s) SubCutaneous three times a day before meals  levothyroxine 100 MICROGram(s) Oral daily  lisinopril 20 milliGRAM(s) Oral daily  methylPREDNISolone sodium succinate IVPB 1000 milliGRAM(s) IV Intermittent daily  metoprolol tartrate 100 milliGRAM(s) Oral two times a day  pantoprazole    Tablet 40 milliGRAM(s) Oral before breakfast      Physical Exam  General: Patient comfortable in bed   Vital Signs Last 12 Hrs  T(F): 98.3 (11-13-23 @ 13:22), Max: 98.3 (11-13-23 @ 13:22)  HR: 62 (11-13-23 @ 13:22) (62 - 65)  BP: 127/69 (11-13-23 @ 13:22) (125/72 - 127/69)  BP(mean): --  RR: 18 (11-13-23 @ 13:22) (18 - 18)  SpO2: 93% (11-13-23 @ 13:22) (93% - 96%)    CVS: S1S2   Respiratory: No wheezing, no crepitations  GI: Abdomen soft, bowel sounds positive  Musculoskeletal:  moves all extremities  : Voiding

## 2023-11-13 NOTE — PROGRESS NOTE ADULT - ASSESSMENT
77yo woman with a PMHx of T2DM, HTN, HLD, hypothyroidism, AFib on ELiquis, last dose in am of 11/11  and bilateral cataract surgery presents to [facility] ER after one episode of transient right eye vision loss, 3 weeks of episodic headaches, found to have elevated ESR and CRP, transferred to [facility] to be seen by optho and neuro, given 1 gm IV solumedrol in ED due to concern for GCA, admitted for further management and rheum eval.    AMAUROSIS FUGAX  HTN  HLD  R/O GCA  Afib  Hyperglycemia in a setting of IV steroids and known DM  Transient vision loss of right eye, appearing to have a curtain like grey haze lasting about 10-15 minutes, then spontaneously resolved per patient. There is a  concern for amaurosis fugax  possibly 2/2 GCA as patient found to have elevated ESR/CRP, endorsed temporal tenderness, headaches, and jaw pain. Will also need to evaluate for potential vascular etiology. Less likely 2/2 intraorbital pathology. Patient denies any visual deficit, headache, or temporal/ scalp tenderness at present.      - seen by vascular. 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 R superficial TA  seem by be inflamed as well  - on pulse dose steroids: IV 1g solumedrol for 3 days, probably to be followed by 1 mg/kg prednisone.   -  rheumatology called  - hyperglycemia 2/2 steroids,  cont  on insulin on a SS, Lantus and premeal admelog. Endo called  - Afib. change AC to Lovenox 80 mg q12H in prep for TA Biopsy. hold lovenox today  - Medically cleared for TA biopsy today  - DVT ppx not necessary. ptn is on full AC. GI ppx w PPI

## 2023-11-13 NOTE — CONSULT NOTE ADULT - CONSULT REASON
rule out GCA
Right Temporal artery biopsy
Transient R visual disturbance, concern for GCA vs TIA
High Blood Sugars/DMT2
Transient vision loss, right eye
Cardiac Clearance

## 2023-11-13 NOTE — PRE PROCEDURE NOTE - PRE PROCEDURE EVALUATION
Diagnosis/Indication: Patient is a 78y old  Female who presents with 1 episode of transient vision loss and 3 weeks of episodic headaches. Planned for R temporal artery biopsy to rule out temporal arteritis.     PAST MEDICAL & SURGICAL HISTORY:  Hypertension      Hyperlipidemia      Atrial fibrillation      Diabetes      No significant past surgical history           Female    Allergies: No Known Allergies      LABS:  CBC Full  -  ( 12 Nov 2023 06:26 )  WBC Count : 7.97 K/uL  RBC Count : 4.30 M/uL  Hemoglobin : 12.1 g/dL  Hematocrit : 37.0 %  Platelet Count - Automated : 309 K/uL  Mean Cell Volume : 86.0 fl  Mean Cell Hemoglobin : 28.1 pg  Mean Cell Hemoglobin Concentration : 32.7 gm/dL  Auto Neutrophil # : x  Auto Lymphocyte # : x  Auto Monocyte # : x  Auto Eosinophil # : x  Auto Basophil # : x  Auto Neutrophil % : x  Auto Lymphocyte % : x  Auto Monocyte % : x  Auto Eosinophil % : x  Auto Basophil % : x    11-12    139  |  104  |  19  ----------------------------<  312<H>  4.8   |  24  |  0.74    Ca    9.0      12 Nov 2023 06:26    TPro  6.9  /  Alb  3.5  /  TBili  0.2  /  DBili  x   /  AST  12  /  ALT  11  /  AlkPhos  96  11-12    PT/INR - ( 12 Nov 2023 06:33 )   PT: 13.0 sec;   INR: 1.19 ratio         PTT - ( 12 Nov 2023 06:33 )  PTT:30.3 sec    PLAN:  -NPO  -AM labs  -Document medical and  cardiac clearances  -Pt added on for OR today 11/13  -Page vascular surgery [id number] with any questions/concerns

## 2023-11-13 NOTE — PROGRESS NOTE ADULT - SUBJECTIVE AND OBJECTIVE BOX
ENOCTARI CASIE  [id number]    INTERVAL HPI/OVERNIGHT EVENTS:  no more headaches   no further vision change   temporal artery biopsy pending    MEDICATIONS  (STANDING):  atorvastatin 10 milliGRAM(s) Oral at bedtime  dextrose 5%. 1000 milliLiter(s) (100 mL/Hr) IV Continuous <Continuous>  dextrose 5%. 1000 milliLiter(s) (50 mL/Hr) IV Continuous <Continuous>  dextrose 50% Injectable 25 Gram(s) IV Push once  dextrose 50% Injectable 12.5 Gram(s) IV Push once  dextrose 50% Injectable 25 Gram(s) IV Push once  glucagon  Injectable 1 milliGRAM(s) IntraMuscular once  influenza  Vaccine (HIGH DOSE) 0.7 milliLiter(s) IntraMuscular once  insulin glargine Injectable (LANTUS) 25 Unit(s) SubCutaneous at bedtime  insulin lispro (ADMELOG) corrective regimen sliding scale   SubCutaneous three times a day before meals  insulin lispro (ADMELOG) corrective regimen sliding scale   SubCutaneous at bedtime  insulin lispro Injectable (ADMELOG) 8 Unit(s) SubCutaneous three times a day before meals  levothyroxine 100 MICROGram(s) Oral daily  lisinopril 20 milliGRAM(s) Oral daily  methylPREDNISolone sodium succinate IVPB 1000 milliGRAM(s) IV Intermittent daily  metoprolol tartrate 100 milliGRAM(s) Oral two times a day  pantoprazole    Tablet 40 milliGRAM(s) Oral before breakfast    MEDICATIONS  (PRN):  dextrose Oral Gel 15 Gram(s) Oral once PRN Blood Glucose LESS THAN 70 milliGRAM(s)/deciliter      Allergies    No Known Allergies    Intolerances        Review of Systems: as per HPI, otherwise negative.       Vital Signs Last 24 Hrs  T(C): 36.8 (13 Nov 2023 13:22), Max: 36.8 (13 Nov 2023 13:22)  T(F): 98.3 (13 Nov 2023 13:22), Max: 98.3 (13 Nov 2023 13:22)  HR: 62 (13 Nov 2023 13:22) (62 - 65)  BP: 127/69 (13 Nov 2023 13:22) (125/72 - 127/69)  BP(mean): --  RR: 18 (13 Nov 2023 13:22) (18 - 18)  SpO2: 93% (13 Nov 2023 13:22) (93% - 96%)    Parameters below as of 13 Nov 2023 13:22  Patient On (Oxygen Delivery Method): room air        Physical Exam:  General: NAD  HEENT: EOMI, MMM  MSK: OA changs of the hands.   Neuro: AAOx3  Psych: wnl    LABS:                        10.8   15.91 )-----------( 320      ( 13 Nov 2023 05:45 )             31.6     11-13    138  |  105  |  22  ----------------------------<  307<H>  4.0   |  22  |  0.72    Ca    8.9      13 Nov 2023 05:45    TPro  6.9  /  Alb  3.5  /  TBili  0.2  /  DBili  x   /  AST  12  /  ALT  11  /  AlkPhos  96  11-12    PT/INR - ( 13 Nov 2023 05:45 )   PT: 12.2 sec;   INR: 1.17 ratio         PTT - ( 13 Nov 2023 05:45 )  PTT:26.8 sec  Urinalysis Basic - ( 13 Nov 2023 05:45 )    Color: x / Appearance: x / SG: x / pH: x  Gluc: 307 mg/dL / Ketone: x  / Bili: x / Urobili: x   Blood: x / Protein: x / Nitrite: x   Leuk Esterase: x / RBC: x / WBC x   Sq Epi: x / Non Sq Epi: x / Bacteria: x          RADIOLOGY & ADDITIONAL TESTS:  MRI ORBIT:  Right more than left optic perineuritis, with prominent vascularity   indicating an inflammatory process and vasculitis is strongly suspected.   No mass or compressive lesion.    Right superficial temporal artery appears involved; correlate for   tenderness. Trigeminal neuropathy versus  myositis queried on   left.    MRI BRAIN:  Nonfocal and grossly unremarkable.  No infarct, mass or pathologic enhancement.

## 2023-11-13 NOTE — PROGRESS NOTE ADULT - ASSESSMENT
Assessment  DMT2: 78y Female with DM T2 with hyperglycemia admitted with ? temporal arteritis, on high dose steroids,  A1C  7.3%  , on insulin coverage, blood sugars are running high,  no hypoglycemic episode,  eating meals, compliant with low carb diet. Getting high dose IV steroids. NPO for procedure.    Temporal arteritis: Being worked up, on high dose steroids, monitored.  HTN: On antihypertensive medications, monitored, asymptomatic.          Discussed plan and management with Dr Mayra Cowart NP - TEAMS  Luis Nunez MD  Cell: [phone]  Office: [phone]        Assessment  DMT2: 78y Female with DM T2 with hyperglycemia admitted with ? temporal arteritis, on high dose steroids,  A1C  7.3%, on insulin coverage, blood sugars are running high,  no hypoglycemic episode,  eating meals, compliant with low carb diet. Getting high dose IV steroids. NPO for procedure.    Temporal arteritis: Being worked up, on high dose steroids, monitored.  HTN: On antihypertensive medications, monitored, asymptomatic.          Discussed plan and management with Dr Mayra Cowart NP - TEAMS  Luis Nunez MD  Cell: [phone]  Office: [phone]

## 2023-11-13 NOTE — PROGRESS NOTE ADULT - ASSESSMENT
#Transient vision loss, right eye  #Intermittent right temporal headache  #Neck pain   #Elevated ESR    rule out GCA  temporal artery biopsy pending   after 3 days of pulse dose steroids x 3 days, transition to prednisone 60 mg daily   Endocrinology follow up for management of hyperglycemia while on steroids.       Dr. Mary Waters  Rheumatology Attending  [facility]  Rheumatology at [place]     Contact:   call the office:: [phone] or reach [place] [employer] Teams,

## 2023-11-13 NOTE — CONSULT NOTE ADULT - PROBLEM SELECTOR RECOMMENDATION 4
Stable   cont with meds
Continue medications, monitoring, FU primary/ neurology team recommendations. .

## 2023-11-13 NOTE — CONSULT NOTE ADULT - TIME BILLING
Advanced care planning was discussed with patient and family.  Advanced care planning forms were reviewed and discussed as appropriate.  Differential diagnosis and plan of care discussed with patient after the evaluation.   Pain assessed and judicious use of narcotics when appropriate was discussed.  Importance of Fall prevention discussed.  Counseling on Smoking and Alcohol cessation was offered when appropriate.  Counseling on Diet, exercise, and medication compliance was done.
Ms. Sanchez is a 78 year old right handed  woman with a PMHx significant for T2DM, HTN, HLD, hypothyroidism, AFib and bilateral cataract surgery presents after one episode of transient right eye vision loss, 3 weeks of episodic headaches, found to have elevated ESR and CRP; given IV solumedrol in ED due to concern for GCA,  During my evaluation, patient also reported jaw pain and denies for weight loss and fever.   Currently headache is resolved s/p High dose steroids 1gm.  Etiology of headache with vision loss, tenderness, elevated ESR & CRP and pain in Jaw most likely due to the temporal arteritis and she will benefit from continue high dose of steroids. Also she will benefit from Temporal artery biopsy.   Non focal exam.   I agree with above exam, assessment and plan unless noted below,  Continue high dose of steroids with GI prophylaxis. Insulin Sliding scale   Continue medical management, neuro- check and fall precaution.  DVT prophylaxis.  I discussed the diagnosis and treatment plan with the patient. Risk benefit and alternatives to the treatment were discussed. All questions and concerns were addressed. The patient demonstrated good understanding of the treatment plan.  If you have any further questions, please do not hesitate to contact our consult service.  Thank you for allowing us to participate in this patient care.

## 2023-11-13 NOTE — CHART NOTE - NSCHARTNOTEFT_GEN_A_CORE
Neurology consulted for visual disturbance. ESR 50. MRA Right more than left optic perineuritis, with prominent vascularity indicating an inflammatory process and vasculitis is strongly suspected. Right superficial temporal artery appears involved. Clinical picture highly suspicious for Temporal Arteritis. Patient being treated with steroids. Pending temporal artery biopsy. Appreciate Rheumatology recommendations.  Neurology will sign off at this time. Please call with any questions or concerns.  Follow up outpatient neurophthalmology Oliverio Duong MD

## 2023-11-13 NOTE — PROGRESS NOTE ADULT - ASSESSMENT
77yo woman with a PMHx of T2DM, HTN, HLD, hypothyroidism, AFib on ELiquis, last dose in am of 11/11 and bilateral cataract surgery presents with 1 episode of transient vision loss and 3 weeks of episodic headaches. Vascular surgery is consulted to rule out temporal arteritis.     Recommendations:  - OR re scheduled for tomorrow: right temporal artery biopsy  will follow

## 2023-11-14 ENCOUNTER — RESULT REVIEW (OUTPATIENT)
Age: 78
End: 2023-11-14

## 2023-11-14 ENCOUNTER — TRANSCRIPTION ENCOUNTER (OUTPATIENT)
Age: 78
End: 2023-11-14

## 2023-11-14 LAB
ACE SERPL-CCNC: 6 U/L — LOW (ref 14–82)
ACE SERPL-CCNC: 6 U/L — LOW (ref 14–82)
ANION GAP SERPL CALC-SCNC: 13 MMOL/L — SIGNIFICANT CHANGE UP (ref 5–17)
ANION GAP SERPL CALC-SCNC: 13 MMOL/L — SIGNIFICANT CHANGE UP (ref 5–17)
ANTI-RIBONUCLEAR PROTEIN: <0.2 AI — SIGNIFICANT CHANGE UP
ANTI-RIBONUCLEAR PROTEIN: <0.2 AI — SIGNIFICANT CHANGE UP
AUTO DIFF PNL BLD: ABNORMAL
AUTO DIFF PNL BLD: ABNORMAL
BUN SERPL-MCNC: 27 MG/DL — HIGH (ref 7–23)
BUN SERPL-MCNC: 27 MG/DL — HIGH (ref 7–23)
C-ANCA SER-ACNC: NEGATIVE — SIGNIFICANT CHANGE UP
C-ANCA SER-ACNC: NEGATIVE — SIGNIFICANT CHANGE UP
CALCIUM SERPL-MCNC: 9.5 MG/DL — SIGNIFICANT CHANGE UP (ref 8.4–10.5)
CALCIUM SERPL-MCNC: 9.5 MG/DL — SIGNIFICANT CHANGE UP (ref 8.4–10.5)
CHLORIDE SERPL-SCNC: 106 MMOL/L — SIGNIFICANT CHANGE UP (ref 96–108)
CHLORIDE SERPL-SCNC: 106 MMOL/L — SIGNIFICANT CHANGE UP (ref 96–108)
CO2 SERPL-SCNC: 22 MMOL/L — SIGNIFICANT CHANGE UP (ref 22–31)
CO2 SERPL-SCNC: 22 MMOL/L — SIGNIFICANT CHANGE UP (ref 22–31)
CREAT SERPL-MCNC: 0.84 MG/DL — SIGNIFICANT CHANGE UP (ref 0.5–1.3)
CREAT SERPL-MCNC: 0.84 MG/DL — SIGNIFICANT CHANGE UP (ref 0.5–1.3)
DSDNA AB FLD-ACNC: <0.2 AI — SIGNIFICANT CHANGE UP
DSDNA AB FLD-ACNC: <0.2 AI — SIGNIFICANT CHANGE UP
EGFR: 72 ML/MIN/1.73M2 — SIGNIFICANT CHANGE UP
EGFR: 72 ML/MIN/1.73M2 — SIGNIFICANT CHANGE UP
ENA SM AB FLD QL: <0.2 AI — SIGNIFICANT CHANGE UP
ENA SM AB FLD QL: <0.2 AI — SIGNIFICANT CHANGE UP
ENA SS-A AB FLD IA-ACNC: <0.2 AI — SIGNIFICANT CHANGE UP
ENA SS-A AB FLD IA-ACNC: <0.2 AI — SIGNIFICANT CHANGE UP
GLUCOSE BLDC GLUCOMTR-MCNC: 166 MG/DL — HIGH (ref 70–99)
GLUCOSE BLDC GLUCOMTR-MCNC: 166 MG/DL — HIGH (ref 70–99)
GLUCOSE BLDC GLUCOMTR-MCNC: 175 MG/DL — HIGH (ref 70–99)
GLUCOSE BLDC GLUCOMTR-MCNC: 175 MG/DL — HIGH (ref 70–99)
GLUCOSE BLDC GLUCOMTR-MCNC: 184 MG/DL — HIGH (ref 70–99)
GLUCOSE BLDC GLUCOMTR-MCNC: 184 MG/DL — HIGH (ref 70–99)
GLUCOSE BLDC GLUCOMTR-MCNC: 204 MG/DL — HIGH (ref 70–99)
GLUCOSE BLDC GLUCOMTR-MCNC: 204 MG/DL — HIGH (ref 70–99)
GLUCOSE BLDC GLUCOMTR-MCNC: 209 MG/DL — HIGH (ref 70–99)
GLUCOSE BLDC GLUCOMTR-MCNC: 209 MG/DL — HIGH (ref 70–99)
GLUCOSE BLDC GLUCOMTR-MCNC: 221 MG/DL — HIGH (ref 70–99)
GLUCOSE BLDC GLUCOMTR-MCNC: 221 MG/DL — HIGH (ref 70–99)
GLUCOSE BLDC GLUCOMTR-MCNC: 228 MG/DL — HIGH (ref 70–99)
GLUCOSE BLDC GLUCOMTR-MCNC: 228 MG/DL — HIGH (ref 70–99)
GLUCOSE SERPL-MCNC: 275 MG/DL — HIGH (ref 70–99)
GLUCOSE SERPL-MCNC: 275 MG/DL — HIGH (ref 70–99)
HCT VFR BLD CALC: 34.9 % — SIGNIFICANT CHANGE UP (ref 34.5–45)
HCT VFR BLD CALC: 34.9 % — SIGNIFICANT CHANGE UP (ref 34.5–45)
HGB BLD-MCNC: 11.4 G/DL — LOW (ref 11.5–15.5)
HGB BLD-MCNC: 11.4 G/DL — LOW (ref 11.5–15.5)
IGG FLD-MCNC: 1002 MG/DL — SIGNIFICANT CHANGE UP (ref 610–1660)
IGG FLD-MCNC: 1002 MG/DL — SIGNIFICANT CHANGE UP (ref 610–1660)
IGG1 SER-MCNC: 520 MG/DL — SIGNIFICANT CHANGE UP (ref 240–1118)
IGG1 SER-MCNC: 520 MG/DL — SIGNIFICANT CHANGE UP (ref 240–1118)
IGG2 SER-MCNC: 365 MG/DL — SIGNIFICANT CHANGE UP (ref 124–549)
IGG2 SER-MCNC: 365 MG/DL — SIGNIFICANT CHANGE UP (ref 124–549)
IGG3 SER-MCNC: 40.9 MG/DL — SIGNIFICANT CHANGE UP (ref 15–102)
IGG3 SER-MCNC: 40.9 MG/DL — SIGNIFICANT CHANGE UP (ref 15–102)
IGG4 SER-MCNC: 38.3 MG/DL — SIGNIFICANT CHANGE UP (ref 1–123)
IGG4 SER-MCNC: 38.3 MG/DL — SIGNIFICANT CHANGE UP (ref 1–123)
INR BLD: 1.08 RATIO — SIGNIFICANT CHANGE UP (ref 0.85–1.18)
INR BLD: 1.08 RATIO — SIGNIFICANT CHANGE UP (ref 0.85–1.18)
MCHC RBC-ENTMCNC: 28.2 PG — SIGNIFICANT CHANGE UP (ref 27–34)
MCHC RBC-ENTMCNC: 28.2 PG — SIGNIFICANT CHANGE UP (ref 27–34)
MCHC RBC-ENTMCNC: 32.7 GM/DL — SIGNIFICANT CHANGE UP (ref 32–36)
MCHC RBC-ENTMCNC: 32.7 GM/DL — SIGNIFICANT CHANGE UP (ref 32–36)
MCV RBC AUTO: 86.4 FL — SIGNIFICANT CHANGE UP (ref 80–100)
MCV RBC AUTO: 86.4 FL — SIGNIFICANT CHANGE UP (ref 80–100)
MPO AB + PR3 PNL SER: SIGNIFICANT CHANGE UP
MPO AB + PR3 PNL SER: SIGNIFICANT CHANGE UP
NRBC # BLD: 0 /100 WBCS — SIGNIFICANT CHANGE UP (ref 0–0)
NRBC # BLD: 0 /100 WBCS — SIGNIFICANT CHANGE UP (ref 0–0)
P-ANCA SER-ACNC: NEGATIVE — SIGNIFICANT CHANGE UP
P-ANCA SER-ACNC: NEGATIVE — SIGNIFICANT CHANGE UP
PLATELET # BLD AUTO: 361 K/UL — SIGNIFICANT CHANGE UP (ref 150–400)
PLATELET # BLD AUTO: 361 K/UL — SIGNIFICANT CHANGE UP (ref 150–400)
POTASSIUM SERPL-MCNC: 4.2 MMOL/L — SIGNIFICANT CHANGE UP (ref 3.5–5.3)
POTASSIUM SERPL-MCNC: 4.2 MMOL/L — SIGNIFICANT CHANGE UP (ref 3.5–5.3)
POTASSIUM SERPL-SCNC: 4.2 MMOL/L — SIGNIFICANT CHANGE UP (ref 3.5–5.3)
POTASSIUM SERPL-SCNC: 4.2 MMOL/L — SIGNIFICANT CHANGE UP (ref 3.5–5.3)
PROTHROM AB SERPL-ACNC: 11.3 SEC — SIGNIFICANT CHANGE UP (ref 9.5–13)
PROTHROM AB SERPL-ACNC: 11.3 SEC — SIGNIFICANT CHANGE UP (ref 9.5–13)
RBC # BLD: 4.04 M/UL — SIGNIFICANT CHANGE UP (ref 3.8–5.2)
RBC # BLD: 4.04 M/UL — SIGNIFICANT CHANGE UP (ref 3.8–5.2)
RBC # FLD: 12.9 % — SIGNIFICANT CHANGE UP (ref 10.3–14.5)
RBC # FLD: 12.9 % — SIGNIFICANT CHANGE UP (ref 10.3–14.5)
SODIUM SERPL-SCNC: 141 MMOL/L — SIGNIFICANT CHANGE UP (ref 135–145)
SODIUM SERPL-SCNC: 141 MMOL/L — SIGNIFICANT CHANGE UP (ref 135–145)
WBC # BLD: 14.97 K/UL — HIGH (ref 3.8–10.5)
WBC # BLD: 14.97 K/UL — HIGH (ref 3.8–10.5)
WBC # FLD AUTO: 14.97 K/UL — HIGH (ref 3.8–10.5)
WBC # FLD AUTO: 14.97 K/UL — HIGH (ref 3.8–10.5)

## 2023-11-14 PROCEDURE — 37609 LIGATION/BX TEMPORAL ARTERY: CPT | Mod: RT

## 2023-11-14 PROCEDURE — 88313 SPECIAL STAINS GROUP 2: CPT | Mod: 26

## 2023-11-14 PROCEDURE — 93010 ELECTROCARDIOGRAM REPORT: CPT

## 2023-11-14 PROCEDURE — 88305 TISSUE EXAM BY PATHOLOGIST: CPT | Mod: 26

## 2023-11-14 DEVICE — CLIP APPLIER COVIDIEN SURGICLIP III 9" SM: Type: IMPLANTABLE DEVICE | Site: RIGHT | Status: FUNCTIONAL

## 2023-11-14 RX ORDER — FENTANYL CITRATE 50 UG/ML
50 INJECTION INTRAVENOUS
Refills: 0 | Status: DISCONTINUED | OUTPATIENT
Start: 2023-11-14 | End: 2023-11-14

## 2023-11-14 RX ORDER — DEXTROSE 50 % IN WATER 50 %
12.5 SYRINGE (ML) INTRAVENOUS ONCE
Refills: 0 | Status: DISCONTINUED | OUTPATIENT
Start: 2023-11-14 | End: 2023-11-15

## 2023-11-14 RX ORDER — SODIUM CHLORIDE 9 MG/ML
1000 INJECTION, SOLUTION INTRAVENOUS
Refills: 0 | Status: DISCONTINUED | OUTPATIENT
Start: 2023-11-14 | End: 2023-11-15

## 2023-11-14 RX ORDER — PANTOPRAZOLE SODIUM 20 MG/1
40 TABLET, DELAYED RELEASE ORAL
Refills: 0 | Status: DISCONTINUED | OUTPATIENT
Start: 2023-11-14 | End: 2023-11-15

## 2023-11-14 RX ORDER — ATORVASTATIN CALCIUM 80 MG/1
10 TABLET, FILM COATED ORAL AT BEDTIME
Refills: 0 | Status: DISCONTINUED | OUTPATIENT
Start: 2023-11-14 | End: 2023-11-15

## 2023-11-14 RX ORDER — GLUCAGON INJECTION, SOLUTION 0.5 MG/.1ML
1 INJECTION, SOLUTION SUBCUTANEOUS ONCE
Refills: 0 | Status: DISCONTINUED | OUTPATIENT
Start: 2023-11-14 | End: 2023-11-15

## 2023-11-14 RX ORDER — METOPROLOL TARTRATE 50 MG
100 TABLET ORAL
Refills: 0 | Status: DISCONTINUED | OUTPATIENT
Start: 2023-11-14 | End: 2023-11-15

## 2023-11-14 RX ORDER — DEXTROSE 50 % IN WATER 50 %
15 SYRINGE (ML) INTRAVENOUS ONCE
Refills: 0 | Status: DISCONTINUED | OUTPATIENT
Start: 2023-11-14 | End: 2023-11-15

## 2023-11-14 RX ORDER — DEXTROSE 50 % IN WATER 50 %
25 SYRINGE (ML) INTRAVENOUS ONCE
Refills: 0 | Status: DISCONTINUED | OUTPATIENT
Start: 2023-11-14 | End: 2023-11-15

## 2023-11-14 RX ORDER — LEVOTHYROXINE SODIUM 125 MCG
100 TABLET ORAL DAILY
Refills: 0 | Status: DISCONTINUED | OUTPATIENT
Start: 2023-11-14 | End: 2023-11-15

## 2023-11-14 RX ORDER — HUMAN INSULIN 100 [IU]/ML
12 INJECTION, SUSPENSION SUBCUTANEOUS ONCE
Refills: 0 | Status: COMPLETED | OUTPATIENT
Start: 2023-11-14 | End: 2023-11-14

## 2023-11-14 RX ORDER — INSULIN GLARGINE 100 [IU]/ML
25 INJECTION, SOLUTION SUBCUTANEOUS AT BEDTIME
Refills: 0 | Status: DISCONTINUED | OUTPATIENT
Start: 2023-11-14 | End: 2023-11-15

## 2023-11-14 RX ORDER — INSULIN LISPRO 100/ML
VIAL (ML) SUBCUTANEOUS
Refills: 0 | Status: DISCONTINUED | OUTPATIENT
Start: 2023-11-14 | End: 2023-11-15

## 2023-11-14 RX ORDER — INSULIN LISPRO 100/ML
8 VIAL (ML) SUBCUTANEOUS
Refills: 0 | Status: DISCONTINUED | OUTPATIENT
Start: 2023-11-14 | End: 2023-11-15

## 2023-11-14 RX ORDER — OXYCODONE HYDROCHLORIDE 5 MG/1
2.5 TABLET ORAL EVERY 6 HOURS
Refills: 0 | Status: DISCONTINUED | OUTPATIENT
Start: 2023-11-14 | End: 2023-11-15

## 2023-11-14 RX ORDER — FENTANYL CITRATE 50 UG/ML
25 INJECTION INTRAVENOUS
Refills: 0 | Status: DISCONTINUED | OUTPATIENT
Start: 2023-11-14 | End: 2023-11-14

## 2023-11-14 RX ORDER — ACETAMINOPHEN 500 MG
650 TABLET ORAL EVERY 6 HOURS
Refills: 0 | Status: DISCONTINUED | OUTPATIENT
Start: 2023-11-14 | End: 2023-11-15

## 2023-11-14 RX ORDER — ONDANSETRON 8 MG/1
4 TABLET, FILM COATED ORAL EVERY 6 HOURS
Refills: 0 | Status: DISCONTINUED | OUTPATIENT
Start: 2023-11-14 | End: 2023-11-14

## 2023-11-14 RX ORDER — INSULIN LISPRO 100/ML
VIAL (ML) SUBCUTANEOUS AT BEDTIME
Refills: 0 | Status: DISCONTINUED | OUTPATIENT
Start: 2023-11-14 | End: 2023-11-15

## 2023-11-14 RX ORDER — LISINOPRIL 2.5 MG/1
20 TABLET ORAL DAILY
Refills: 0 | Status: DISCONTINUED | OUTPATIENT
Start: 2023-11-14 | End: 2023-11-15

## 2023-11-14 RX ADMIN — Medication 2: at 08:53

## 2023-11-14 RX ADMIN — LISINOPRIL 20 MILLIGRAM(S): 2.5 TABLET ORAL at 05:12

## 2023-11-14 RX ADMIN — PANTOPRAZOLE SODIUM 40 MILLIGRAM(S): 20 TABLET, DELAYED RELEASE ORAL at 05:12

## 2023-11-14 RX ADMIN — Medication 2: at 17:26

## 2023-11-14 RX ADMIN — HUMAN INSULIN 12 UNIT(S): 100 INJECTION, SUSPENSION SUBCUTANEOUS at 08:53

## 2023-11-14 RX ADMIN — Medication 100 MICROGRAM(S): at 05:12

## 2023-11-14 RX ADMIN — Medication 1: at 13:24

## 2023-11-14 RX ADMIN — Medication 50 MILLIGRAM(S): at 08:50

## 2023-11-14 NOTE — PRE-ANESTHESIA EVALUATION ADULT - NSANTHPMHFT_GEN_ALL_CORE
Medical history and ROS reviewed  h/o A fib on Eliquis (held since 11/11)  No current headaches or vision changes  ET > 4 METS, no CP, no CHF symptoms

## 2023-11-14 NOTE — PROGRESS NOTE ADULT - SUBJECTIVE AND OBJECTIVE BOX
SURGERY PROGRESS NOTE    Visual disturbance        CARMELINA JASON  |  [id number]      S: LIZ overnight. Pt seen and evaluated bedside this AM. Pt resting comfortably on exam. NPO. Pain well controlled.    MEDICATIONS  (STANDING):  atorvastatin 10 milliGRAM(s) Oral at bedtime  dextrose 5%. 1000 milliLiter(s) (50 mL/Hr) IV Continuous <Continuous>  dextrose 5%. 1000 milliLiter(s) (100 mL/Hr) IV Continuous <Continuous>  dextrose 50% Injectable 25 Gram(s) IV Push once  dextrose 50% Injectable 12.5 Gram(s) IV Push once  dextrose 50% Injectable 25 Gram(s) IV Push once  glucagon  Injectable 1 milliGRAM(s) IntraMuscular once  influenza  Vaccine (HIGH DOSE) 0.7 milliLiter(s) IntraMuscular once  insulin glargine Injectable (LANTUS) 25 Unit(s) SubCutaneous at bedtime  insulin lispro (ADMELOG) corrective regimen sliding scale   SubCutaneous three times a day before meals  insulin lispro (ADMELOG) corrective regimen sliding scale   SubCutaneous at bedtime  insulin lispro Injectable (ADMELOG) 8 Unit(s) SubCutaneous three times a day before meals  levothyroxine 100 MICROGram(s) Oral daily  lisinopril 20 milliGRAM(s) Oral daily  metoprolol tartrate 100 milliGRAM(s) Oral two times a day  pantoprazole    Tablet 40 milliGRAM(s) Oral before breakfast    MEDICATIONS  (PRN):  dextrose Oral Gel 15 Gram(s) Oral once PRN Blood Glucose LESS THAN 70 milliGRAM(s)/deciliter      O:   Vital Signs Last 24 Hrs  T(C): 36.8 (14 Nov 2023 11:49), Max: 36.8 (14 Nov 2023 11:49)  T(F): 98.2 (14 Nov 2023 11:49), Max: 98.2 (14 Nov 2023 11:49)  HR: 52 (14 Nov 2023 11:49) (52 - 70)  BP: 147/55 (14 Nov 2023 11:49) (137/57 - 147/55)  BP(mean): --  RR: 18 (14 Nov 2023 11:49) (18 - 18)  SpO2: 96% (14 Nov 2023 11:49) (96% - 97%)    Parameters below as of 14 Nov 2023 11:49  Patient On (Oxygen Delivery Method): room air        PHYSICAL EXAM:  GENERAL: NAD, well-groomed, well-developed  HEENT: NC/AT  CHEST/LUNG: Breathing even, unlabored  HEART: Regular rate and rhythm  ABDOMEN: Soft, nondistended.   EXTREMITIES: good distal pulses b/l   NEURO:  No focal deficits                          11.4   14.97 )-----------( 361      ( 14 Nov 2023 07:03 )             34.9     11-14    141  |  106  |  27<H>  ----------------------------<  275<H>  4.2   |  22  |  0.84    Ca    9.5      14 Nov 2023 07:03            IMAGING STUDIES:

## 2023-11-14 NOTE — PROGRESS NOTE ADULT - SUBJECTIVE AND OBJECTIVE BOX
Chief complaint  Patient is a 78y old  Female who presents with a chief complaint of vision loss (13 Nov 2023 21:07)         Labs and Fingersticks  CAPILLARY BLOOD GLUCOSE      POCT Blood Glucose.: 221 mg/dL (14 Nov 2023 08:21)  POCT Blood Glucose.: 364 mg/dL (13 Nov 2023 21:50)  POCT Blood Glucose.: 412 mg/dL (13 Nov 2023 17:30)  POCT Blood Glucose.: 318 mg/dL (13 Nov 2023 12:24)      Anion Gap: 13 (11-14 @ 07:03)  Anion Gap: 11 (11-13 @ 05:45)      Calcium: 9.5 (11-14 @ 07:03)  Calcium: 8.9 (11-13 @ 05:45)          11-14    141  |  106  |  27<H>  ----------------------------<  275<H>  4.2   |  22  |  0.84    Ca    9.5      14 Nov 2023 07:03                          11.4   14.97 )-----------( 361      ( 14 Nov 2023 07:03 )             34.9     Medications  MEDICATIONS  (STANDING):  atorvastatin 10 milliGRAM(s) Oral at bedtime  dextrose 5%. 1000 milliLiter(s) (100 mL/Hr) IV Continuous <Continuous>  dextrose 5%. 1000 milliLiter(s) (50 mL/Hr) IV Continuous <Continuous>  dextrose 50% Injectable 25 Gram(s) IV Push once  dextrose 50% Injectable 12.5 Gram(s) IV Push once  dextrose 50% Injectable 25 Gram(s) IV Push once  glucagon  Injectable 1 milliGRAM(s) IntraMuscular once  influenza  Vaccine (HIGH DOSE) 0.7 milliLiter(s) IntraMuscular once  insulin glargine Injectable (LANTUS) 25 Unit(s) SubCutaneous at bedtime  insulin lispro (ADMELOG) corrective regimen sliding scale   SubCutaneous three times a day before meals  insulin lispro (ADMELOG) corrective regimen sliding scale   SubCutaneous at bedtime  insulin lispro Injectable (ADMELOG) 8 Unit(s) SubCutaneous three times a day before meals  levothyroxine 100 MICROGram(s) Oral daily  lisinopril 20 milliGRAM(s) Oral daily  metoprolol tartrate 100 milliGRAM(s) Oral two times a day  pantoprazole    Tablet 40 milliGRAM(s) Oral before breakfast      Physical Exam  General: Patient comfortable in bed   Vital Signs Last 12 Hrs  T(F): 98.2 (11-14-23 @ 11:49), Max: 98.2 (11-14-23 @ 11:49)  HR: 52 (11-14-23 @ 11:49) (52 - 58)  BP: 147/55 (11-14-23 @ 11:49) (146/68 - 147/55)  BP(mean): --  RR: 18 (11-14-23 @ 11:49) (18 - 18)  SpO2: 96% (11-14-23 @ 11:49) (96% - 96%)    CVS: S1S2   Respiratory: No wheezing, no crepitations  GI: Abdomen soft, bowel sounds positive  Musculoskeletal:  moves all extremities  : Voiding        Chief complaint  Patient is a 78y old  Female who presents with a chief complaint of vision loss (13 Nov 2023 21:07)         Labs and Fingersticks  CAPILLARY BLOOD GLUCOSE      POCT Blood Glucose.: 221 mg/dL (14 Nov 2023 08:21)  POCT Blood Glucose.: 364 mg/dL (13 Nov 2023 21:50)  POCT Blood Glucose.: 412 mg/dL (13 Nov 2023 17:30)  POCT Blood Glucose.: 318 mg/dL (13 Nov 2023 12:24)      Anion Gap: 13 (11-14 @ 07:03)  Anion Gap: 11 (11-13 @ 05:45)      Calcium: 9.5 (11-14 @ 07:03)  Calcium: 8.9 (11-13 @ 05:45)          11-14    141  |  106  |  27<H>  ----------------------------<  275<H>  4.2   |  22  |  0.84    Ca    9.5      14 Nov 2023 07:03                          11.4   14.97 )-----------( 361      ( 14 Nov 2023 07:03 )             34.9     Medications  MEDICATIONS  (STANDING):  atorvastatin 10 milliGRAM(s) Oral at bedtime  dextrose 5%. 1000 milliLiter(s) (100 mL/Hr) IV Continuous <Continuous>  dextrose 5%. 1000 milliLiter(s) (50 mL/Hr) IV Continuous <Continuous>  dextrose 50% Injectable 25 Gram(s) IV Push once  dextrose 50% Injectable 12.5 Gram(s) IV Push once  dextrose 50% Injectable 25 Gram(s) IV Push once  glucagon  Injectable 1 milliGRAM(s) IntraMuscular once  influenza  Vaccine (HIGH DOSE) 0.7 milliLiter(s) IntraMuscular once  insulin glargine Injectable (LANTUS) 25 Unit(s) SubCutaneous at bedtime  insulin lispro (ADMELOG) corrective regimen sliding scale   SubCutaneous three times a day before meals  insulin lispro (ADMELOG) corrective regimen sliding scale   SubCutaneous at bedtime  insulin lispro Injectable (ADMELOG) 8 Unit(s) SubCutaneous three times a day before meals  levothyroxine 100 MICROGram(s) Oral daily  lisinopril 20 milliGRAM(s) Oral daily  metoprolol tartrate 100 milliGRAM(s) Oral two times a day  pantoprazole    Tablet 40 milliGRAM(s) Oral before breakfast      Physical Exam  General: Patient comfortable in bed   Vital Signs Last 12 Hrs  T(F): 98.2 (11-14-23 @ 11:49), Max: 98.2 (11-14-23 @ 11:49)  HR: 52 (11-14-23 @ 11:49) (52 - 58)  BP: 147/55 (11-14-23 @ 11:49) (146/68 - 147/55)  BP(mean): --  RR: 18 (11-14-23 @ 11:49) (18 - 18)  SpO2: 96% (11-14-23 @ 11:49) (96% - 96%)    CVS: S1S2   Respiratory: No wheezing, no crepitations  GI: Abdomen soft, bowel sounds positive  Musculoskeletal:  moves all extremities  : Voiding

## 2023-11-14 NOTE — PROGRESS NOTE ADULT - ASSESSMENT
77yo woman with a PMHx of T2DM, HTN, HLD, hypothyroidism, AFib on ELiquis, last dose in am of 11/11 and bilateral cataract surgery presents with 1 episode of transient vision loss and 3 weeks of episodic headaches  Plan for temporal artery biopsy today.   NO chest pain or shortness of breath   states she sees a cardiologist in [place]

## 2023-11-14 NOTE — PRE-OP CHECKLIST - SITE MARKED BY SURGEON
Called pt and scheduled for LEEP on Aug 30 at 11 am with LC.   Pt agrees
Pt is calling and would like to schedule a Leep procedure with LC
Right/yes

## 2023-11-14 NOTE — PROGRESS NOTE ADULT - SUBJECTIVE AND OBJECTIVE BOX
Patient is a 78y old  Female who presents with a chief complaint of vision loss (13 Nov 2023 21:07)      SUBJECTIVE / OVERNIGHT EVENTS: TA biopsy today, on pulse dose steroids for GCA    MEDICATIONS  (STANDING):  acetaminophen     Tablet .. 650 milliGRAM(s) Oral every 6 hours  atorvastatin 10 milliGRAM(s) Oral at bedtime  dextrose 5%. 1000 milliLiter(s) (50 mL/Hr) IV Continuous <Continuous>  dextrose 5%. 1000 milliLiter(s) (100 mL/Hr) IV Continuous <Continuous>  dextrose 50% Injectable 25 Gram(s) IV Push once  dextrose 50% Injectable 12.5 Gram(s) IV Push once  dextrose 50% Injectable 25 Gram(s) IV Push once  glucagon  Injectable 1 milliGRAM(s) IntraMuscular once  influenza  Vaccine (HIGH DOSE) 0.7 milliLiter(s) IntraMuscular once  insulin glargine Injectable (LANTUS) 25 Unit(s) SubCutaneous at bedtime  insulin lispro (ADMELOG) corrective regimen sliding scale   SubCutaneous three times a day before meals  insulin lispro (ADMELOG) corrective regimen sliding scale   SubCutaneous at bedtime  insulin lispro Injectable (ADMELOG) 8 Unit(s) SubCutaneous three times a day before meals  levothyroxine 100 MICROGram(s) Oral daily  lisinopril 20 milliGRAM(s) Oral daily  metoprolol tartrate 100 milliGRAM(s) Oral two times a day  pantoprazole    Tablet 40 milliGRAM(s) Oral before breakfast    MEDICATIONS  (PRN):  dextrose Oral Gel 15 Gram(s) Oral once PRN Blood Glucose LESS THAN 70 milliGRAM(s)/deciliter  oxyCODONE    IR 2.5 milliGRAM(s) Oral every 6 hours PRN Moderate Pain (4 - 6)      Vital Signs Last 24 Hrs  T(F): 97.6 (11-14-23 @ 22:40), Max: 98.2 (11-14-23 @ 11:49)  HR: 47 (11-14-23 @ 22:40) (43 - 67)  BP: 137/66 (11-14-23 @ 22:40) (124/61 - 155/75)  RR: 18 (11-14-23 @ 22:40) (14 - 18)  SpO2: 97% (11-14-23 @ 22:40) (93% - 98%)  Telemetry:   CAPILLARY BLOOD GLUCOSE      POCT Blood Glucose.: 228 mg/dL (14 Nov 2023 22:33)  POCT Blood Glucose.: 204 mg/dL (14 Nov 2023 20:44)  POCT Blood Glucose.: 184 mg/dL (14 Nov 2023 19:40)  POCT Blood Glucose.: 209 mg/dL (14 Nov 2023 17:20)  POCT Blood Glucose.: 166 mg/dL (14 Nov 2023 12:54)  POCT Blood Glucose.: 175 mg/dL (14 Nov 2023 12:52)  POCT Blood Glucose.: 221 mg/dL (14 Nov 2023 08:21)    I&O's Summary      PHYSICAL EXAM:  GENERAL: NAD, well-developed  HEAD:  Atraumatic, Normocephalic  EYES: EOMI, PERRLA, conjunctiva and sclera clear  NECK: Supple, No JVD  CHEST/LUNG: Clear to auscultation bilaterally; No wheeze  HEART: Regular rate and rhythm; No murmurs, rubs, or gallops  ABDOMEN: Soft, Nontender, Nondistended; Bowel sounds present  EXTREMITIES:  2+ Peripheral Pulses, No clubbing, cyanosis, or edema  PSYCH: AAOx3  NEUROLOGY: non-focal  SKIN: No rashes or lesions    LABS:                        11.4   14.97 )-----------( 361      ( 14 Nov 2023 07:03 )             34.9     11-14    141  |  106  |  27<H>  ----------------------------<  275<H>  4.2   |  22  |  0.84    Ca    9.5      14 Nov 2023 07:03      PT/INR - ( 14 Nov 2023 07:03 )   PT: 11.3 sec;   INR: 1.08 ratio         PTT - ( 13 Nov 2023 05:45 )  PTT:26.8 sec  CARDIAC MARKERS ( 13 Nov 2023 05:45 )  x     / x     / 270 U/L / x     / x          Urinalysis Basic - ( 14 Nov 2023 07:03 )    Color: x / Appearance: x / SG: x / pH: x  Gluc: 275 mg/dL / Ketone: x  / Bili: x / Urobili: x   Blood: x / Protein: x / Nitrite: x   Leuk Esterase: x / RBC: x / WBC x   Sq Epi: x / Non Sq Epi: x / Bacteria: x        RADIOLOGY & ADDITIONAL TESTS:    Imaging Personally Reviewed:    Consultant(s) Notes Reviewed:      Care Discussed with Consultants/Other Providers:

## 2023-11-14 NOTE — PROGRESS NOTE ADULT - ASSESSMENT
77yo woman with a PMHx of T2DM, HTN, HLD, hypothyroidism, AFib on ELiquis, last dose in am of 11/11  and bilateral cataract surgery presents to [place] ER after one episode of transient right eye vision loss, 3 weeks of episodic headaches, found to have elevated ESR and CRP, transferred to [facility] to be seen by optho and neuro, given 1 gm IV solumedrol in ED due to concern for GCA, admitted for further management and rheum eval.    AMAUROSIS FUGAX  HTN  HLD  R/O GCA  Afib  Hyperglycemia in a setting of IV steroids and known DM  Transient vision loss of right eye, appearing to have a curtain like grey haze lasting about 10-15 minutes, then spontaneously resolved per patient. There is a  concern for amaurosis fugax  possibly 2/2 GCA as patient found to have elevated ESR/CRP, endorsed temporal tenderness, headaches, and jaw pain. Will also need to evaluate for potential vascular etiology. Less likely 2/2 intraorbital pathology. Patient denies any visual deficit, headache, or temporal/ scalp tenderness at present.      - seen by vascular. 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 R superficial TA  seem by be inflamed as well  - on pulse dose steroids: IV 1g solumedrol for 3 days, probably to be followed by 1 mg/kg prednisone.   -  rheumatology called  - hyperglycemia 2/2 steroids,  cont  on insulin on a SS, Lantus and premeal admelog. Endo called  - Afib. change AC to Lovenox 80 mg q12H in prep for TA Biopsy. hold lovenox today  - Medically cleared for TA biopsy today  - DVT ppx not necessary. ptn is on full AC. GI ppx w PPI

## 2023-11-14 NOTE — PROGRESS NOTE ADULT - ASSESSMENT
Assessment  DMT2: 78y Female with DM T2 with hyperglycemia admitted with ? temporal arteritis, on high dose steroids,  A1C  7.3%, on insulin coverage, blood sugars are running high,  no hypoglycemic episode,  eating meals, compliant with low carb diet. Getting high dose IV steroids. NPO for procedure.    Temporal arteritis: Being worked up, on high dose steroids, monitored.  HTN: On antihypertensive medications, monitored, asymptomatic.        Discussed plan and management with Dr Mayra Cowart NP - TEAMS  Luis Nunez MD  Cell: [phone]  Office: [phone]      Assessment  DMT2: 78y Female with DM T2 with hyperglycemia admitted with ? temporal arteritis, on high dose steroids,  A1C  7.3%, on insulin coverage,  blood sugars are running high,  no hypoglycemic episode,  eating meals, compliant with low carb diet. Getting high dose IV steroids. NPO for procedure.    Temporal arteritis: Being worked up, on high dose steroids, monitored.  HTN: On antihypertensive medications, monitored, asymptomatic.        Discussed plan and management with Dr Mayra Cowart NP - TEAMS  Luis Nunez MD  Cell: [phone]  Office: [phone]

## 2023-11-14 NOTE — PROGRESS NOTE ADULT - ASSESSMENT
79yo woman with a PMHx of T2DM, HTN, HLD, hypothyroidism, AFib on ELiquis, last dose in am of 11/11 and bilateral cataract surgery presents with 1 episode of transient vision loss and 3 weeks of episodic headaches. Vascular surgery is consulted to rule out temporal arteritis.     Recommendations:  - OR planned for this PM: right temporal artery biopsy  - Consent in chart  - Document medical and cardiac clearance (obtained)  - Pain management PRN  - Rest of care per primary team    Vascular Surgery  [id number]

## 2023-11-14 NOTE — PROGRESS NOTE ADULT - SUBJECTIVE AND OBJECTIVE BOX
Subjective: Patient seen and examined. No new events except as noted.     REVIEW OF SYSTEM:   CONSTITUTIONAL: + weakness, fevers or chills  EYES/ENT: No visual changes;  No vertigo or throat pain   NECK: No pain or stiffness  RESPIRATORY: No cough, wheezing, hemoptysis; No shortness of breath  CARDIOVASCULAR: No chest pain or palpitations  GASTROINTESTINAL: No abdominal or epigastric pain. No nausea, vomiting, or hematemesis; No diarrhea or constipation. No melena or hematochezia.  GENITOURINARY: No dysuria, frequency or hematuria  NEUROLOGICAL: No numbness or weakness  SKIN: No itching, burning, rashes, or lesions   All other review of systems is negative unless indicated above.    MEDICATIONS:  MEDICATIONS  (STANDING):  atorvastatin 10 milliGRAM(s) Oral at bedtime  dextrose 5%. 1000 milliLiter(s) (100 mL/Hr) IV Continuous <Continuous>  dextrose 5%. 1000 milliLiter(s) (50 mL/Hr) IV Continuous <Continuous>  dextrose 50% Injectable 25 Gram(s) IV Push once  dextrose 50% Injectable 12.5 Gram(s) IV Push once  dextrose 50% Injectable 25 Gram(s) IV Push once  glucagon  Injectable 1 milliGRAM(s) IntraMuscular once  influenza  Vaccine (HIGH DOSE) 0.7 milliLiter(s) IntraMuscular once  insulin glargine Injectable (LANTUS) 25 Unit(s) SubCutaneous at bedtime  insulin lispro (ADMELOG) corrective regimen sliding scale   SubCutaneous three times a day before meals  insulin lispro (ADMELOG) corrective regimen sliding scale   SubCutaneous at bedtime  insulin lispro Injectable (ADMELOG) 8 Unit(s) SubCutaneous three times a day before meals  levothyroxine 100 MICROGram(s) Oral daily  lisinopril 20 milliGRAM(s) Oral daily  metoprolol tartrate 100 milliGRAM(s) Oral two times a day  pantoprazole    Tablet 40 milliGRAM(s) Oral before breakfast      PHYSICAL EXAM:  T(C): 36.3 (11-14-23 @ 04:53), Max: 36.8 (11-13-23 @ 13:22)  HR: 58 (11-14-23 @ 04:53) (58 - 70)  BP: 146/68 (11-14-23 @ 04:53) (127/69 - 146/68)  RR: 18 (11-14-23 @ 04:53) (18 - 18)  SpO2: 96% (11-14-23 @ 04:53) (93% - 97%)  Wt(kg): --  I&O's Summary        Appearance: Normal	  HEENT:   Normal oral mucosa, PERRL, EOMI	  Lymphatic: No lymphadenopathy , no edema  Cardiovascular: Normal S1 S2, No JVD, No murmurs , Peripheral pulses palpable 2+ bilaterally  Respiratory: Lungs clear to auscultation, normal effort 	  Gastrointestinal:  Soft, Non-tender, + BS	  Skin: No rashes, No ecchymoses, No cyanosis, warm to touch  Musculoskeletal: Normal range of motion, normal strength  Psychiatry:  Mood & affect appropriate  Ext: No edema      LABS:    CARDIAC MARKERS:  CARDIAC MARKERS ( 13 Nov 2023 05:45 )  x     / x     / 270 U/L / x     / x                                    11.4   14.97 )-----------( 361      ( 14 Nov 2023 07:03 )             34.9     11-14    141  |  106  |  27<H>  ----------------------------<  275<H>  4.2   |  22  |  0.84    Ca    9.5      14 Nov 2023 07:03      proBNP:   Lipid Profile:   HgA1c:   TSH:             TELEMETRY: 	    ECG:  	  RADIOLOGY:   DIAGNOSTIC TESTING:  [ ] Echocardiogram:  [ ]  Catheterization:  [ ] Stress Test:    OTHER:

## 2023-11-15 ENCOUNTER — TRANSCRIPTION ENCOUNTER (OUTPATIENT)
Age: 78
End: 2023-11-15

## 2023-11-15 VITALS
SYSTOLIC BLOOD PRESSURE: 111 MMHG | TEMPERATURE: 98 F | RESPIRATION RATE: 18 BRPM | DIASTOLIC BLOOD PRESSURE: 68 MMHG | HEART RATE: 61 BPM | OXYGEN SATURATION: 99 %

## 2023-11-15 LAB
ANA TITR SER: NEGATIVE — SIGNIFICANT CHANGE UP
ANA TITR SER: NEGATIVE — SIGNIFICANT CHANGE UP
DSDNA AB SER-ACNC: <12 IU/ML — SIGNIFICANT CHANGE UP
DSDNA AB SER-ACNC: <12 IU/ML — SIGNIFICANT CHANGE UP
GAMMA INTERFERON BACKGROUND BLD IA-ACNC: 0.02 IU/ML — SIGNIFICANT CHANGE UP
GAMMA INTERFERON BACKGROUND BLD IA-ACNC: 0.02 IU/ML — SIGNIFICANT CHANGE UP
GLUCOSE BLDC GLUCOMTR-MCNC: 206 MG/DL — HIGH (ref 70–99)
GLUCOSE BLDC GLUCOMTR-MCNC: 206 MG/DL — HIGH (ref 70–99)
GLUCOSE BLDC GLUCOMTR-MCNC: 286 MG/DL — HIGH (ref 70–99)
GLUCOSE BLDC GLUCOMTR-MCNC: 286 MG/DL — HIGH (ref 70–99)
HCT VFR BLD CALC: 37.9 % — SIGNIFICANT CHANGE UP (ref 34.5–45)
HCT VFR BLD CALC: 37.9 % — SIGNIFICANT CHANGE UP (ref 34.5–45)
HGB BLD-MCNC: 12.6 G/DL — SIGNIFICANT CHANGE UP (ref 11.5–15.5)
HGB BLD-MCNC: 12.6 G/DL — SIGNIFICANT CHANGE UP (ref 11.5–15.5)
M TB IFN-G BLD-IMP: ABNORMAL
M TB IFN-G BLD-IMP: ABNORMAL
M TB IFN-G CD4+ BCKGRND COR BLD-ACNC: 0 IU/ML — SIGNIFICANT CHANGE UP
M TB IFN-G CD4+ BCKGRND COR BLD-ACNC: 0 IU/ML — SIGNIFICANT CHANGE UP
M TB IFN-G CD4+CD8+ BCKGRND COR BLD-ACNC: 0 IU/ML — SIGNIFICANT CHANGE UP
M TB IFN-G CD4+CD8+ BCKGRND COR BLD-ACNC: 0 IU/ML — SIGNIFICANT CHANGE UP
MCHC RBC-ENTMCNC: 28.6 PG — SIGNIFICANT CHANGE UP (ref 27–34)
MCHC RBC-ENTMCNC: 28.6 PG — SIGNIFICANT CHANGE UP (ref 27–34)
MCHC RBC-ENTMCNC: 33.2 GM/DL — SIGNIFICANT CHANGE UP (ref 32–36)
MCHC RBC-ENTMCNC: 33.2 GM/DL — SIGNIFICANT CHANGE UP (ref 32–36)
MCV RBC AUTO: 85.9 FL — SIGNIFICANT CHANGE UP (ref 80–100)
MCV RBC AUTO: 85.9 FL — SIGNIFICANT CHANGE UP (ref 80–100)
MYELOPEROXIDASE AB SER-ACNC: <5 UNITS — SIGNIFICANT CHANGE UP
MYELOPEROXIDASE AB SER-ACNC: <5 UNITS — SIGNIFICANT CHANGE UP
MYELOPEROXIDASE CELLS FLD QL: NEGATIVE — SIGNIFICANT CHANGE UP
MYELOPEROXIDASE CELLS FLD QL: NEGATIVE — SIGNIFICANT CHANGE UP
NRBC # BLD: 0 /100 WBCS — SIGNIFICANT CHANGE UP (ref 0–0)
NRBC # BLD: 0 /100 WBCS — SIGNIFICANT CHANGE UP (ref 0–0)
PLATELET # BLD AUTO: 363 K/UL — SIGNIFICANT CHANGE UP (ref 150–400)
PLATELET # BLD AUTO: 363 K/UL — SIGNIFICANT CHANGE UP (ref 150–400)
PROTEINASE3 AB FLD-ACNC: <5 UNITS — SIGNIFICANT CHANGE UP
PROTEINASE3 AB FLD-ACNC: <5 UNITS — SIGNIFICANT CHANGE UP
PROTEINASE3 AB SER-ACNC: NEGATIVE — SIGNIFICANT CHANGE UP
PROTEINASE3 AB SER-ACNC: NEGATIVE — SIGNIFICANT CHANGE UP
QUANT TB PLUS MITOGEN MINUS NIL: 0.33 IU/ML — SIGNIFICANT CHANGE UP
QUANT TB PLUS MITOGEN MINUS NIL: 0.33 IU/ML — SIGNIFICANT CHANGE UP
RBC # BLD: 4.41 M/UL — SIGNIFICANT CHANGE UP (ref 3.8–5.2)
RBC # BLD: 4.41 M/UL — SIGNIFICANT CHANGE UP (ref 3.8–5.2)
RBC # FLD: 12.8 % — SIGNIFICANT CHANGE UP (ref 10.3–14.5)
RBC # FLD: 12.8 % — SIGNIFICANT CHANGE UP (ref 10.3–14.5)
WBC # BLD: 13.17 K/UL — HIGH (ref 3.8–10.5)
WBC # BLD: 13.17 K/UL — HIGH (ref 3.8–10.5)
WBC # FLD AUTO: 13.17 K/UL — HIGH (ref 3.8–10.5)
WBC # FLD AUTO: 13.17 K/UL — HIGH (ref 3.8–10.5)

## 2023-11-15 RX ORDER — INSULIN LISPRO 100/ML
10 VIAL (ML) SUBCUTANEOUS
Refills: 0 | Status: DISCONTINUED | OUTPATIENT
Start: 2023-11-15 | End: 2023-11-15

## 2023-11-15 RX ADMIN — Medication 8 UNIT(S): at 12:03

## 2023-11-15 RX ADMIN — Medication 8 UNIT(S): at 08:34

## 2023-11-15 RX ADMIN — Medication 2: at 08:34

## 2023-11-15 RX ADMIN — Medication 100 MICROGRAM(S): at 05:46

## 2023-11-15 RX ADMIN — Medication 60 MILLIGRAM(S): at 15:42

## 2023-11-15 RX ADMIN — Medication 1 TABLET(S): at 15:43

## 2023-11-15 RX ADMIN — LISINOPRIL 20 MILLIGRAM(S): 2.5 TABLET ORAL at 05:47

## 2023-11-15 RX ADMIN — PANTOPRAZOLE SODIUM 40 MILLIGRAM(S): 20 TABLET, DELAYED RELEASE ORAL at 07:14

## 2023-11-15 RX ADMIN — Medication 3: at 12:03

## 2023-11-15 NOTE — PROGRESS NOTE ADULT - ASSESSMENT
79yo woman with a PMHx of T2DM, HTN, HLD, hypothyroidism, AFib on ELiquis, last dose in am of 11/11  and bilateral cataract surgery presents to [place] ER after one episode of transient right eye vision loss, 3 weeks of episodic headaches, found to have elevated ESR and CRP, transferred to [facility] to be seen by optho and neuro, given 1 gm IV solumedrol in ED due to concern for GCA, admitted for further management and rheum eval.    AMAUROSIS FUGAX  HTN  HLD  R/O GCA  Afib  Hyperglycemia in a setting of IV steroids and known DM  Transient vision loss of right eye, appearing to have a curtain like grey haze lasting about 10-15 minutes, then spontaneously resolved per patient. There is a  concern for amaurosis fugax  possibly 2/2 GCA as patient found to have elevated ESR/CRP, endorsed temporal tenderness, headaches, and jaw pain. Will also need to evaluate for potential vascular etiology. Less likely 2/2 intraorbital pathology. Patient denies any visual deficit, headache, or temporal/ scalp tenderness at present.      - seen by vascular. ptn's HA and diplopia had resolved, s/p TA biopsy. 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 R superficial TA  seem by be inflamed as well  - s/p pulse dose steroids: IV 1g solumedrol for 3 days, today transitioned to po Prednisone  -  rheumatology following  - hyperglycemia 2/2 steroids,  cont  on insulin on a SS, Lantus and premeal admelog. Endo following  - Afib. resume eliquis  - DVT ppx not necessary. ptn is on full AC. GI ppx w PPI  - dc home, start PCP ppx

## 2023-11-15 NOTE — DISCHARGE NOTE PROVIDER - NSDCMRMEDTOKEN_GEN_ALL_CORE_FT
atorvastatin 10 mg oral tablet: 1 tab(s) orally once a day  Eliquis 2.5 mg oral tablet: 1 tab(s) orally 2 times a day  ferrous sulfate 325 mg (65 mg elemental iron) oral delayed release tablet: 1 tab(s) orally once a day  levothyroxine 100 mcg (0.1 mg) oral tablet: 1 tab(s) orally once a day  metoprolol tartrate 100 mg oral tablet: 1 tab(s) orally 2 times a day  ramipril 5 mg oral capsule: 1 cap(s) orally once a day  Xultophy 100 units-3.6 mg/mL subcutaneous solution: 14 unit(s) subcutaneous once a day   atorvastatin 10 mg oral tablet: 1 tab(s) orally once a day  Bactrim  mg-160 mg oral tablet: 1 tab(s) orally once a day while on high dose prednisone  Eliquis 2.5 mg oral tablet: 1 tab(s) orally 2 times a day  ferrous sulfate 325 mg (65 mg elemental iron) oral delayed release tablet: 1 tab(s) orally once a day  levothyroxine 100 mcg (0.1 mg) oral tablet: 1 tab(s) orally once a day  metoprolol tartrate 100 mg oral tablet: 1 tab(s) orally 2 times a day  predniSONE 20 mg oral tablet: 3 tab(s) orally once a day til rheumatology follow-up appointment on 11/21/23  ramipril 5 mg oral capsule: 1 cap(s) orally once a day  Xultophy 100 units-3.6 mg/mL subcutaneous solution: 14 unit(s) subcutaneous once a day

## 2023-11-15 NOTE — PROGRESS NOTE ADULT - PROBLEM SELECTOR PLAN 3
Suggest to continue medications, monitoring, FU primary team recommendations.
RISS.
Suggest to continue medications, monitoring, FU primary team recommendations.
Suggest to continue medications, monitoring, FU primary team recommendations.
RISS.

## 2023-11-15 NOTE — PROGRESS NOTE ADULT - ASSESSMENT
79yo woman with a PMHx of T2DM, HTN, HLD, hypothyroidism, AFib on ELiquis, last dose in am of 11/11 and bilateral cataract surgery presents with 1 episode of transient vision loss and 3 weeks of episodic headaches. Vascular surgery is consulted to rule out temporal arteritis.     Recommendations:  - no acute vascular intervention  - Pain management PRN  - vascular surgery to sign off  - Rest of care per primary team    Vascular Surgery  [id number]

## 2023-11-15 NOTE — PROGRESS NOTE ADULT - SUBJECTIVE AND OBJECTIVE BOX
Patient is a 78y old  Female who presents with a chief complaint of vision loss (13 Nov 2023 21:07)      SUBJECTIVE / OVERNIGHT EVENTS: transition to po prednisone today    MEDICATIONS  (STANDING):  acetaminophen     Tablet .. 650 milliGRAM(s) Oral every 6 hours  atorvastatin 10 milliGRAM(s) Oral at bedtime  dextrose 5%. 1000 milliLiter(s) (50 mL/Hr) IV Continuous <Continuous>  dextrose 5%. 1000 milliLiter(s) (100 mL/Hr) IV Continuous <Continuous>  dextrose 50% Injectable 25 Gram(s) IV Push once  dextrose 50% Injectable 25 Gram(s) IV Push once  dextrose 50% Injectable 12.5 Gram(s) IV Push once  glucagon  Injectable 1 milliGRAM(s) IntraMuscular once  influenza  Vaccine (HIGH DOSE) 0.7 milliLiter(s) IntraMuscular once  insulin glargine Injectable (LANTUS) 25 Unit(s) SubCutaneous at bedtime  insulin lispro (ADMELOG) corrective regimen sliding scale   SubCutaneous three times a day before meals  insulin lispro (ADMELOG) corrective regimen sliding scale   SubCutaneous at bedtime  insulin lispro Injectable (ADMELOG) 10 Unit(s) SubCutaneous three times a day before meals  levothyroxine 100 MICROGram(s) Oral daily  lisinopril 20 milliGRAM(s) Oral daily  metoprolol tartrate 100 milliGRAM(s) Oral two times a day  pantoprazole    Tablet 40 milliGRAM(s) Oral before breakfast    MEDICATIONS  (PRN):  dextrose Oral Gel 15 Gram(s) Oral once PRN Blood Glucose LESS THAN 70 milliGRAM(s)/deciliter  oxyCODONE    IR 2.5 milliGRAM(s) Oral every 6 hours PRN Moderate Pain (4 - 6)      Vital Signs Last 24 Hrs  T(F): 98 (11-15-23 @ 11:39), Max: 98.1 (11-14-23 @ 17:44)  HR: 57 (11-15-23 @ 11:39) (43 - 67)  BP: 121/66 (11-15-23 @ 11:39) (121/66 - 155/75)  RR: 18 (11-15-23 @ 11:39) (14 - 18)  SpO2: 98% (11-15-23 @ 11:39) (93% - 98%)  Telemetry:   CAPILLARY BLOOD GLUCOSE      POCT Blood Glucose.: 286 mg/dL (15 Nov 2023 11:38)  POCT Blood Glucose.: 206 mg/dL (15 Nov 2023 08:24)  POCT Blood Glucose.: 228 mg/dL (14 Nov 2023 22:33)  POCT Blood Glucose.: 204 mg/dL (14 Nov 2023 20:44)  POCT Blood Glucose.: 184 mg/dL (14 Nov 2023 19:40)  POCT Blood Glucose.: 209 mg/dL (14 Nov 2023 17:20)    I&O's Summary      PHYSICAL EXAM:  GENERAL: NAD, well-developed  HEAD:  Atraumatic, Normocephalic  EYES: EOMI, PERRLA, conjunctiva and sclera clear  NECK: Supple, No JVD  CHEST/LUNG: Clear to auscultation bilaterally; No wheeze  HEART: Regular rate and rhythm; No murmurs, rubs, or gallops  ABDOMEN: Soft, Nontender, Nondistended; Bowel sounds present  EXTREMITIES:  2+ Peripheral Pulses, No clubbing, cyanosis, or edema  PSYCH: AAOx3  NEUROLOGY: non-focal  SKIN: No rashes or lesions    LABS:                        12.6   13.17 )-----------( 363      ( 15 Nov 2023 07:08 )             37.9     11-14    141  |  106  |  27<H>  ----------------------------<  275<H>  4.2   |  22  |  0.84    Ca    9.5      14 Nov 2023 07:03      PT/INR - ( 14 Nov 2023 07:03 )   PT: 11.3 sec;   INR: 1.08 ratio               Urinalysis Basic - ( 14 Nov 2023 07:03 )    Color: x / Appearance: x / SG: x / pH: x  Gluc: 275 mg/dL / Ketone: x  / Bili: x / Urobili: x   Blood: x / Protein: x / Nitrite: x   Leuk Esterase: x / RBC: x / WBC x   Sq Epi: x / Non Sq Epi: x / Bacteria: x        RADIOLOGY & ADDITIONAL TESTS:    Imaging Personally Reviewed:    Consultant(s) Notes Reviewed:      Care Discussed with Consultants/Other Providers:

## 2023-11-15 NOTE — DISCHARGE NOTE PROVIDER - PROVIDER TOKENS
PROVIDER:[TOKEN:[[id number]],FOLLOWUP:[1 week]],PROVIDER:[TOKEN:[[id number]],SCHEDULEDAPPT:[11/21/2023],SCHEDULEDAPPTTIME:[02:00 PM]]

## 2023-11-15 NOTE — DISCHARGE NOTE PROVIDER - NSDCFUSCHEDAPPT_GEN_ALL_CORE_FT
Florian Diana  [facility] Physician [facility]  VASCULAR [address]  Scheduled Appointment: 12/05/2023    Mary Waters  [facility]  [address]  Scheduled Appointment: 12/22/2023     Florian Diana  [facility] Physician [facility]  VASCULAR [address]  Scheduled Appointment: 12/05/2023    Moe Chaudhry  [facility] Physician [facility]  OPHTHALM [address]  Scheduled Appointment: 12/20/2023    Mary Waters  [facility] Physician [facility]  RHEUM [address]  Scheduled Appointment: 12/22/2023    Joya Hart  [facility] Physician [facility]  ENDOCRIN [address]  Scheduled Appointment: 01/23/2024

## 2023-11-15 NOTE — DISCHARGE NOTE PROVIDER - HOSPITAL COURSE
HPI:  77yo woman with a PMHx of T2DM, HTN, HLD, hypothyroidism, AFib on ELiquis, last dose in am of 11/11 and bilateral cataract surgery presents with 1 episode of transient vision loss and 3 weeks of episodic headaches.   Pt reported on 11/11 at around 2PM to [facility] ER after she noticed her right eye vision was decreased as if a gray film was over her vision for 10-15 minutes.   Denied pain, flashes, curtain over vision or diplopia when assessed by Ophthalmology. Reports this episode resolved without intervention and that vision is now back to baseline. Denies head or eye trauma. Denies prior similar episodes. Also reports she woke up 11/11 with lightheadedness and fatigue. Reports chronic floaters that are unchanged. Pt also reports she has had 3 weeks of episodic headaches that are generalized however with prominence in bilateral temporal areas, reports that she feels whole scalp tenderness when the headache occurs, also has tenderness when palpating bilateral temporal areas. She denied jaw claudication, fevers, chills, or significant joint pains.   At  [facility] ESR was found to be 50, pt received 60mg IV solumedrol. CTH noncon was wnl. Pt then transferred to [facility] for ophthalmology evaluation. on 11/12 received 1g IV solumedrol. CRP 23.  Patient reports that her vision is at baseline now, denies any visual deficit, headache, or temporal/ scalp tenderness at present.  This morning patient reports that her symptoms may be dental related and does endorse jaw pain. Denies any joint pain.   Seen by Neuro and optho in the ED, rheum called (12 Nov 2023 15:10)    Hospital Course:    Admitted for visual changes/temporal headache, s/p ophthal and neuro eval. VA duplex temporal artery showed periarterial edema surrounding right temporal artery; s/p temporal artery biopsy 11/14. CTA head neg. 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 Vascular and rheum followed as well. Found to have hyperglycemia 2/2 high dose steroids. Pt to f/u rheumatology on 11/21 as scheduled and to continue po prednisone 60 mg until rheum f/u. Medically cleared to be dc'ed home per attending Dr. Stoll.       Active or Pending Issues Requiring Follow-up:  Follow up with rheumatology as scheduled on 11/21, endocrine within 1 week, ophthalmology as outpatient     Advanced Directives:   [ ] Full code  [ ] DNR  [ ] Hospice    Discharge Diagnoses:  Visual changes  Diabetes  Atrial fibrillation  Temporal arteritis  HTN

## 2023-11-15 NOTE — DISCHARGE NOTE NURSING/CASE MANAGEMENT/SOCIAL WORK - PATIENT PORTAL LINK FT
You can access the FollowMyHealth Patient Portal offered by [facility] by registering at the following website: [email or web address]. By joining [employer]’s FollowMyHealth portal, you will also be able to view your health information using other applications (apps) compatible with our system.

## 2023-11-15 NOTE — PROGRESS NOTE ADULT - PROBLEM SELECTOR PLAN 2
Suggest to continue medications, monitoring, FU primary team recommendations.
rate controlled   check thyroid panel.
Suggest to continue medications, monitoring, FU primary team recommendations.
Suggest to continue medications, monitoring, FU primary team recommendations.
rate controlled   check thyroid panel.

## 2023-11-15 NOTE — DISCHARGE NOTE NURSING/CASE MANAGEMENT/SOCIAL WORK - NSDCPEELIQUISDIET_GEN_ALL_CORE
Bedside shift change report given to CATINA Bradford (oncoming nurse) by CATINA Garcia (offgoing nurse). Report included the following information SBAR, Kardex, MAR and Cardiac Rhythm NSR. Eat healthy foods you enjoy. Apixaban/Eliquis DOES NOT have a special diet. Limit your alcohol intake.

## 2023-11-15 NOTE — PROGRESS NOTE ADULT - PROBLEM SELECTOR PLAN 4
Suggest to continue medications, monitoring, FU primary team recommendations.  RHeum follow up
Stable   cont with meds.
Suggest to continue medications, monitoring, FU primary team recommendations.  RHeum follow up
Stable   cont with meds.
Suggest to continue medications, monitoring, FU primary team recommendations.  RHeum follow up

## 2023-11-15 NOTE — DISCHARGE NOTE PROVIDER - CARE PROVIDER_API CALL
Luis Nunez)  Endocrinology/Metab/Diabetes  [address] [place] [address]  Phone: [phone]  Fax: [phone]  Follow Up Time: 1 week    Mary Waters  Rheumatology  [address] [address]  Phone: [phone]  Fax: [phone]  Scheduled Appointment: 11/21/2023 02:00 PM

## 2023-11-15 NOTE — DISCHARGE NOTE PROVIDER - NSDCCPCAREPLAN_GEN_ALL_CORE_FT
PRINCIPAL DISCHARGE DIAGNOSIS  Diagnosis: Visual changes  Assessment and Plan of Treatment: You were admitted for visual changes. Ophthalmology and neurology team followed you. Duplex of temporal artery showed edema surround right temporal artery. You underwent a temporal artery biopsy by vascular surgery on 11/14. You were discharged on oral prednisone and continue until follow up with rheumatologist Dr. Waters as scheduled on 11/21/23 at 2 pm.         SECONDARY DISCHARGE DIAGNOSES  Diagnosis: Diabetes  Assessment and Plan of Treatment:   Make sure you get your HgA1c checked every three months.  If you take oral diabetes medications, check your blood glucose two times a day.  If you take insulin, check your blood glucose before meals and at bedtime.  It's important not to skip any meals.  Keep a log of your blood glucose results and always take it with you to your doctor appointments.  Keep a list of your current medications including injectables and over the counter medications and bring this medication list with you to all your doctor appointments.  If you have not seen your ophthalmologist this year call for appointment.  Check your feet daily for redness, sores, or openings. Do not self treat. If no improvement in two days call your primary care physician for an appointment.  Low blood sugar (hypoglycemia) is a blood sugar below 70mg/dl. Check your blood sugar if you feel signs/symptoms of hypoglycemia. If your blood sugar is below 70 take 15 grams of carbohydrates (ex 4 oz of apple juice, 3-4 glucose tablets, or 4-6 oz of regular soda) wait 15 minutes and repeat blood sugar to make sure it comes up above 70.  If your blood sugar is above 70 and you are due for a meal, have a meal.  If you are not due for a meal have a snack.  This snack helps keeps your blood sugar at a safe range.  Follow up with your endocrinologist Dr. Nunez within this week for diabetes management as you were discharged on oral steroids.       Diagnosis: Atrial fibrillation  Assessment and Plan of Treatment: Atrial fibrillation is the most common heart rhythm problem.  The condition puts you at risk for has stroke and heart attack  It helps if you control your blood pressure, not drink more than 1-2 alcohol drinks per day, cut down on caffeine, getting treatment for over active thyroid gland, and get regular exercise  Call your doctor if you feel your heart racing or beating unusually, chest tightness or pain, lightheaded, faint, shortness of breath especially with exercise  It is important to take your heart medication as prescribed  You may be on anticoagulation which is very important to take as directed - you may need blood work to monitor drug levels      Diagnosis: Temporal arteritis  Assessment and Plan of Treatment: You were admitted for visual changes, found to have temporal arteritis. Ophthalmology and neurology team followed you. Duplex of temporal artery showed edema surround right temporal artery. You underwent a temporal artery biopsy by vascular surgery on 11/14. You were discharged on oral prednisone and continue until follow up with rheumatologist Dr. Waters as scheduled on 11/21/23 at 2 pm.    Diagnosis: Hypertension  Assessment and Plan of Treatment: Low salt diet  Activity as tolerated.  Take all medication as prescribed.  Follow up with your medical doctor for routine blood pressure monitoring at your next visit.  Notify your doctor if you have any of the following symptoms:   Dizziness, Lightheadedness, Blurry vision, Headache, Chest pain, Shortness of breath

## 2023-11-15 NOTE — PROGRESS NOTE ADULT - PROBLEM/PLAN-4
DISPLAY PLAN FREE TEXT
The patient is a 29y Female complaining of medical evaluation.
DISPLAY PLAN FREE TEXT
DISPLAY PLAN FREE TEXT

## 2023-11-15 NOTE — PROGRESS NOTE ADULT - PROBLEM SELECTOR PLAN 1
Plan for TA biopsy   Acceptable cardiac risk to proceed   RCRI 0.
CHEYANNE Diana MD have participated in the daily care of this patient  and have seen and examined the patient today and agree with  the  evaluation, assessment and plan of the surgical house officer  CHEYANNE Diana MD have personally seen and examined the patient at bedside today at 7  pm
Plan for TA biopsy   Acceptable cardiac risk to proceed   RCRI 0.
NPH dose given x1, resume basal bolus insulin when eating.   Will continue current insulin regimen for now.  Missed doses due to NPO  Will continue monitoring FS, log, and glucose trends, will Follow up.  Patient counseled for compliance with consistent low carb diet and exercise as tolerated outpatient.
Will continue current insulin regimen for now.   Will continue monitoring FS, log, and glucose trends, will Follow up.  Patient counseled for compliance with consistent low carb diet and exercise as tolerated outpatient.
Will continue current insulin regimen for now.    Will continue monitoring FS, log, and glucose trends, will Follow up.  Patient counseled for compliance with consistent low carb diet and exercise as tolerated outpatient.  She was on home insulin

## 2023-11-15 NOTE — PROGRESS NOTE ADULT - SUBJECTIVE AND OBJECTIVE BOX
Subjective: Patient seen and examined. No new events except as noted.     REVIEW OF SYSTEMS:    CONSTITUTIONAL: No weakness, fevers or chills  EYES/ENT: No visual changes;  No vertigo or throat pain   NECK: No pain or stiffness  RESPIRATORY: No cough, wheezing, hemoptysis; No shortness of breath  CARDIOVASCULAR: No chest pain or palpitations  GASTROINTESTINAL: No abdominal or epigastric pain. No nausea, vomiting, or hematemesis; No diarrhea or constipation. No melena or hematochezia.  GENITOURINARY: No dysuria, frequency or hematuria  NEUROLOGICAL: No numbness or weakness  SKIN: No itching, burning, rashes, or lesions   All other review of systems is negative unless indicated above.    MEDICATIONS:  MEDICATIONS  (STANDING):  acetaminophen     Tablet .. 650 milliGRAM(s) Oral every 6 hours  atorvastatin 10 milliGRAM(s) Oral at bedtime  dextrose 5%. 1000 milliLiter(s) (100 mL/Hr) IV Continuous <Continuous>  dextrose 5%. 1000 milliLiter(s) (50 mL/Hr) IV Continuous <Continuous>  dextrose 50% Injectable 25 Gram(s) IV Push once  dextrose 50% Injectable 12.5 Gram(s) IV Push once  dextrose 50% Injectable 25 Gram(s) IV Push once  glucagon  Injectable 1 milliGRAM(s) IntraMuscular once  influenza  Vaccine (HIGH DOSE) 0.7 milliLiter(s) IntraMuscular once  insulin glargine Injectable (LANTUS) 25 Unit(s) SubCutaneous at bedtime  insulin lispro (ADMELOG) corrective regimen sliding scale   SubCutaneous at bedtime  insulin lispro (ADMELOG) corrective regimen sliding scale   SubCutaneous three times a day before meals  insulin lispro Injectable (ADMELOG) 8 Unit(s) SubCutaneous three times a day before meals  levothyroxine 100 MICROGram(s) Oral daily  lisinopril 20 milliGRAM(s) Oral daily  metoprolol tartrate 100 milliGRAM(s) Oral two times a day  pantoprazole    Tablet 40 milliGRAM(s) Oral before breakfast      PHYSICAL EXAM:  T(C): 36.6 (11-15-23 @ 05:01), Max: 36.8 (11-14-23 @ 11:49)  HR: 57 (11-15-23 @ 05:01) (43 - 67)  BP: 134/68 (11-15-23 @ 05:01) (124/61 - 155/75)  RR: 18 (11-15-23 @ 05:01) (14 - 18)  SpO2: 98% (11-15-23 @ 05:01) (93% - 98%)  Wt(kg): --  I&O's Summary    Height (cm): 154.9 (11-14 @ 18:13)  Weight (kg): 79.4 (11-14 @ 18:13)  BMI (kg/m2): 33.1 (11-14 @ 18:13)  BSA (m2): 1.78 (11-14 @ 18:13)    Appearance: Normal	  HEENT:   Normal oral mucosa, PERRL, EOMI	  Lymphatic: No lymphadenopathy , no edema  Cardiovascular: Normal S1 S2, No JVD, No murmurs , Peripheral pulses palpable 2+ bilaterally  Respiratory: Lungs clear to auscultation, normal effort 	  Gastrointestinal:  Soft, Non-tender, + BS	  Skin: No rashes, No ecchymoses, No cyanosis, warm to touch  Musculoskeletal: Normal range of motion, normal strength  Psychiatry:  Mood & affect appropriate  Ext: No edema      LABS:    CARDIAC MARKERS:  CARDIAC MARKERS ( 13 Nov 2023 05:45 )  x     / x     / 270 U/L / x     / x                                    12.6   13.17 )-----------( 363      ( 15 Nov 2023 07:08 )             37.9     11-14    141  |  106  |  27<H>  ----------------------------<  275<H>  4.2   |  22  |  0.84    Ca    9.5      14 Nov 2023 07:03      proBNP:   Lipid Profile:   HgA1c:   TSH:             TELEMETRY: 	    ECG:  	  RADIOLOGY:   DIAGNOSTIC TESTING:  [ ] Echocardiogram:  [ ]  Catheterization:  [ ] Stress Test:    OTHER:

## 2023-11-15 NOTE — PROGRESS NOTE ADULT - SUBJECTIVE AND OBJECTIVE BOX
Overnight events:   - No acute events    SUBJECTIVE:  Patient was seen and examined on AM rounds. Denies new complaints.    OBJECTIVE:  Vital Signs Last 24 Hrs  T(C): 36.6 (15 Nov 2023 05:01), Max: 36.8 (14 Nov 2023 11:49)  T(F): 97.8 (15 Nov 2023 05:01), Max: 98.2 (14 Nov 2023 11:49)  HR: 57 (15 Nov 2023 05:01) (43 - 67)  BP: 134/68 (15 Nov 2023 05:01) (124/61 - 155/75)  BP(mean): 97 (14 Nov 2023 21:30) (78 - 97)  RR: 18 (15 Nov 2023 05:01) (14 - 18)  SpO2: 98% (15 Nov 2023 05:01) (93% - 98%)    Parameters below as of 15 Nov 2023 05:01  Patient On (Oxygen Delivery Method): room air        Physical Examination:  GENERAL: NAD, well-groomed, well-developed  HEENT: NC/AT, temporal artery incision is c/d/i  CHEST/LUNG: Breathing even, unlabored  HEART: Regular rate and rhythm  ABDOMEN: Soft, nondistended.   EXTREMITIES: good distal pulses b/l   NEURO:  No focal deficits      LABS:                        12.6   13.17 )-----------( 363      ( 15 Nov 2023 07:08 )             37.9       11-14    141  |  106  |  27<H>  ----------------------------<  275<H>  4.2   |  22  |  0.84    Ca    9.5      14 Nov 2023 07:03

## 2023-11-15 NOTE — DISCHARGE NOTE PROVIDER - NSFOLLOWUPCLINICS_GEN_ALL_ED_FT
[facility]hthal[facility]  Ophthalmology  [address] [place] [address], [facility] [[email or web address]] [address]  [place] [address]  Phone: [phone]  Fax:

## 2023-11-15 NOTE — PROGRESS NOTE ADULT - NS ATTEND AMEND GEN_ALL_CORE FT
Chart, labs, vitals, radiology reviewed. Above H&P reviewed and edited where appropriate. Agree with history and physical exam. Agree with assessment and plan. I reviewed the overnight course of events and discussed the care with the patient/ family.  All the decisions in assessment and plan are made by me..
Chart, labs, vitals, radiology reviewed. Above H&P reviewed and edited where appropriate. Agree with history and physical exam. Agree with assessment and plan. I reviewed the overnight course of events and discussed the care with the patient/ family.  All the decisions in assessment and plan are made by karyna
Chart, labs, vitals, radiology reviewed. Above H&P reviewed and edited where appropriate. Agree with history and physical exam. Agree with assessment and plan. I reviewed the overnight course of events and discussed the care with the patient/ family.  All the decisions in assessment and plan are made by me.

## 2023-11-15 NOTE — PROGRESS NOTE ADULT - PROVIDER SPECIALTY LIST ADULT
Endocrinology
Internal Medicine
Vascular Surgery
Rheumatology
Vascular Surgery
Internal Medicine
Internal Medicine
Vascular Surgery
Endocrinology
Endocrinology
Cardiology
Cardiology

## 2023-11-15 NOTE — DISCHARGE NOTE PROVIDER - NSDCFUADDAPPT_GEN_ALL_CORE_FT
APPTS ARE READY TO BE MADE: [X ] YES    Best Family or Patient Contact (if needed):    Additional Information about above appointments (if needed):    1:   2:   3:     Other comments or requests:    APPTS ARE READY TO BE MADE: [X ] YES    Best Family or Patient Contact (if needed):    Additional Information about above appointments (if needed):    1:   2:   3:     Other comments or requests:   Patient's daughter advised they would prefer us to send them an email containing providers in [facility] as the providers requested are all in [place]. Patient's daughter has to take time off of work to take patient to appointments so she prefers to schedule on her own.  APPTS ARE READY TO BE MADE: [X ] YES    Best Family or Patient Contact (if needed):    Additional Information about above appointments (if needed):    1:   2:   3:     Other comments or requests:   Patient's daughter advised they would prefer us to send them an email containing providers in [facility] as the providers requested are all in [place]. Patient's daughter called back to request assistance   Patient scheduled for rheumatology appointment on 12/22/23 at 1:40P at [address] [place] [address] with Dr. Mary Waters  APPTS ARE READY TO BE MADE: [X ] YES    Best Family or Patient Contact (if needed):    Additional Information about above appointments (if needed):    1:   2:   3:     Other comments or requests:   Patient's daughter advised they would prefer us to send them an email containing providers in [facility] as the providers requested are all in [place]. Patient's daughter called back to request assistance     Patient scheduled for rheumatology appointment on 12/22/23 at 1:40P at [address] [facility] with Dr. Mary Waters     Patient was scheduled for an ophthalmology appointment on 12/20 at 1:15pm at [address] with Dr. Moe Chaudhry. Patient should bring   sunglasses. A message was sent to the office to request a sooner appointment.     Patient was scheduled with Dr. Joya Hart for endocrinology on 1/23/24 at 2:30PM at [address] [facility]. Endocrinology appointments have limited availability. Patient can call office to check for cancellations periodically.

## 2023-11-15 NOTE — PROGRESS NOTE ADULT - SUBJECTIVE AND OBJECTIVE BOX
Chief complaint  Patient is a 78y old  Female who presents with a chief complaint of vision loss (13 Nov 2023 21:07)         Labs and Fingersticks  CAPILLARY BLOOD GLUCOSE      POCT Blood Glucose.: 286 mg/dL (15 Nov 2023 11:38)  POCT Blood Glucose.: 206 mg/dL (15 Nov 2023 08:24)  POCT Blood Glucose.: 228 mg/dL (14 Nov 2023 22:33)  POCT Blood Glucose.: 204 mg/dL (14 Nov 2023 20:44)  POCT Blood Glucose.: 184 mg/dL (14 Nov 2023 19:40)  POCT Blood Glucose.: 209 mg/dL (14 Nov 2023 17:20)      Anion Gap: 13 (11-14 @ 07:03)      Calcium: 9.5 (11-14 @ 07:03)          11-14    141  |  106  |  27<H>  ----------------------------<  275<H>  4.2   |  22  |  0.84    Ca    9.5      14 Nov 2023 07:03                          12.6   13.17 )-----------( 363      ( 15 Nov 2023 07:08 )             37.9     Medications  MEDICATIONS  (STANDING):  acetaminophen     Tablet .. 650 milliGRAM(s) Oral every 6 hours  atorvastatin 10 milliGRAM(s) Oral at bedtime  dextrose 5%. 1000 milliLiter(s) (50 mL/Hr) IV Continuous <Continuous>  dextrose 5%. 1000 milliLiter(s) (100 mL/Hr) IV Continuous <Continuous>  dextrose 50% Injectable 25 Gram(s) IV Push once  dextrose 50% Injectable 12.5 Gram(s) IV Push once  dextrose 50% Injectable 25 Gram(s) IV Push once  glucagon  Injectable 1 milliGRAM(s) IntraMuscular once  influenza  Vaccine (HIGH DOSE) 0.7 milliLiter(s) IntraMuscular once  insulin glargine Injectable (LANTUS) 25 Unit(s) SubCutaneous at bedtime  insulin lispro (ADMELOG) corrective regimen sliding scale   SubCutaneous three times a day before meals  insulin lispro (ADMELOG) corrective regimen sliding scale   SubCutaneous at bedtime  insulin lispro Injectable (ADMELOG) 10 Unit(s) SubCutaneous three times a day before meals  levothyroxine 100 MICROGram(s) Oral daily  lisinopril 20 milliGRAM(s) Oral daily  metoprolol tartrate 100 milliGRAM(s) Oral two times a day  pantoprazole    Tablet 40 milliGRAM(s) Oral before breakfast  predniSONE   Tablet 60 milliGRAM(s) Oral daily  trimethoprim   80 mG/sulfamethoxazole 400 mG 1 Tablet(s) Oral daily      Physical Exam  General: Patient comfortable in bed   Vital Signs Last 12 Hrs  T(F): 98 (11-15-23 @ 11:39), Max: 98 (11-15-23 @ 11:39)  HR: 57 (11-15-23 @ 11:39) (57 - 57)  BP: 121/66 (11-15-23 @ 11:39) (121/66 - 134/68)  BP(mean): --  RR: 18 (11-15-23 @ 11:39) (18 - 18)  SpO2: 98% (11-15-23 @ 11:39) (98% - 98%)    CVS: S1S2   Respiratory: No wheezing, no crepitations  GI: Abdomen soft, bowel sounds positive  Musculoskeletal:  moves all extremities  : Voiding        Chief complaint  Patient is a 78y old  Female who presents with a chief complaint of vision loss (13 Nov 2023 21:07)         Labs and Fingersticks  CAPILLARY BLOOD GLUCOSE      POCT Blood Glucose.: 286 mg/dL (15 Nov 2023 11:38)  POCT Blood Glucose.: 206 mg/dL (15 Nov 2023 08:24)  POCT Blood Glucose.: 228 mg/dL (14 Nov 2023 22:33)  POCT Blood Glucose.: 204 mg/dL (14 Nov 2023 20:44)  POCT Blood Glucose.: 184 mg/dL (14 Nov 2023 19:40)  POCT Blood Glucose.: 209 mg/dL (14 Nov 2023 17:20)      Anion Gap: 13 (11-14 @ 07:03)      Calcium: 9.5 (11-14 @ 07:03)          11-14    141  |  106  |  27<H>  ----------------------------<  275<H>  4.2   |  22  |  0.84    Ca    9.5      14 Nov 2023 07:03                          12.6   13.17 )-----------( 363      ( 15 Nov 2023 07:08 )             37.9     Medications  MEDICATIONS  (STANDING):  acetaminophen     Tablet .. 650 milliGRAM(s) Oral every 6 hours  atorvastatin 10 milliGRAM(s) Oral at bedtime  dextrose 5%. 1000 milliLiter(s) (50 mL/Hr) IV Continuous <Continuous>  dextrose 5%. 1000 milliLiter(s) (100 mL/Hr) IV Continuous <Continuous>  dextrose 50% Injectable 25 Gram(s) IV Push once  dextrose 50% Injectable 12.5 Gram(s) IV Push once  dextrose 50% Injectable 25 Gram(s) IV Push once  glucagon  Injectable 1 milliGRAM(s) IntraMuscular once  influenza  Vaccine (HIGH DOSE) 0.7 milliLiter(s) IntraMuscular once  insulin glargine Injectable (LANTUS) 25 Unit(s) SubCutaneous at bedtime  insulin lispro (ADMELOG) corrective regimen sliding scale   SubCutaneous three times a day before meals  insulin lispro (ADMELOG) corrective regimen sliding scale   SubCutaneous at bedtime  insulin lispro Injectable (ADMELOG) 10 Unit(s) SubCutaneous three times a day before meals  levothyroxine 100 MICROGram(s) Oral daily  lisinopril 20 milliGRAM(s) Oral daily  metoprolol tartrate 100 milliGRAM(s) Oral two times a day  pantoprazole    Tablet 40 milliGRAM(s) Oral before breakfast  predniSONE   Tablet 60 milliGRAM(s) Oral daily  trimethoprim   80 mG/sulfamethoxazole 400 mG 1 Tablet(s) Oral daily      Physical Exam  General: Patient comfortable in bed   Vital Signs Last 12 Hrs  T(F): 98 (11-15-23 @ 11:39), Max: 98 (11-15-23 @ 11:39)  HR: 57 (11-15-23 @ 11:39) (57 - 57)  BP: 121/66 (11-15-23 @ 11:39) (121/66 - 134/68)  BP(mean): --  RR: 18 (11-15-23 @ 11:39) (18 - 18)  SpO2: 98% (11-15-23 @ 11:39) (98% - 98%)    CVS: S1S2   Respiratory: No wheezing, no crepitations  GI: Abdomen soft, bowel sounds positive  Musculoskeletal:  moves all extremities  : Voiding

## 2023-11-15 NOTE — PROGRESS NOTE ADULT - ASSESSMENT
Assessment  DMT2: 78y Female with DM T2 with hyperglycemia admitted with ? temporal arteritis, on high dose steroids,  A1C  7.3%, on insulin coverage,  blood sugars are running high,  no hypoglycemic episode,  eating meals, compliant with low carb diet. Getting high dose IV steroids. Now eating meals.   Temporal arteritis: Being worked up, on high dose steroids, monitored.  HTN: On antihypertensive medications, monitored, asymptomatic.        Discussed plan and management with Dr Mayra Cowart NP - TEAMS  Luis Nunez MD  Cell: [phone]  Office: [phone]      Assessment  DMT2: 78y Female with DM T2 with hyperglycemia admitted with ? temporal arteritis, on high dose steroids,  A1C  7.3%, on insulin coverage, blood sugars are running high,  no hypoglycemic episode,  eating meals, compliant with low carb diet. Getting high dose IV steroids. Now eating meals.   Temporal arteritis: Being worked up, on high dose steroids, monitored.  HTN: On antihypertensive medications, monitored, asymptomatic.        Discussed plan and management with Dr Mayra Cowart NP - TEAMS  Lusi Nunez MD  Cell: [phone]  Office: [phone]

## 2023-11-16 LAB
SURGICAL PATHOLOGY STUDY: SIGNIFICANT CHANGE UP
SURGICAL PATHOLOGY STUDY: SIGNIFICANT CHANGE UP

## 2023-11-17 PROBLEM — I48.91 UNSPECIFIED ATRIAL FIBRILLATION: Chronic | Status: ACTIVE | Noted: 2023-11-12

## 2023-11-17 PROBLEM — E11.9 TYPE 2 DIABETES MELLITUS WITHOUT COMPLICATIONS: Chronic | Status: ACTIVE | Noted: 2023-11-12

## 2023-11-17 PROBLEM — E78.5 HYPERLIPIDEMIA, UNSPECIFIED: Chronic | Status: ACTIVE | Noted: 2023-11-12

## 2023-11-17 PROBLEM — I10 ESSENTIAL (PRIMARY) HYPERTENSION: Chronic | Status: ACTIVE | Noted: 2023-11-12

## 2023-11-20 PROBLEM — Z00.00 ENCOUNTER FOR PREVENTIVE HEALTH EXAMINATION: Status: ACTIVE | Noted: 2023-11-20

## 2023-11-29 ENCOUNTER — APPOINTMENT (OUTPATIENT)
Dept: RHEUMATOLOGY | Facility: CLINIC | Age: 78
End: 2023-11-29
Payer: MEDICARE

## 2023-11-29 ENCOUNTER — NON-APPOINTMENT (OUTPATIENT)
Age: 78
End: 2023-11-29

## 2023-11-29 VITALS
DIASTOLIC BLOOD PRESSURE: 68 MMHG | TEMPERATURE: 97 F | OXYGEN SATURATION: 97 % | HEIGHT: 61 IN | SYSTOLIC BLOOD PRESSURE: 138 MMHG | BODY MASS INDEX: 32.47 KG/M2 | HEART RATE: 71 BPM | WEIGHT: 172 LBS

## 2023-11-29 PROCEDURE — 99215 OFFICE O/P EST HI 40 MIN: CPT

## 2023-11-29 RX ORDER — BUTALBITAL, ACETAMINOPHEN, AND CAFFEINE 50; 300; 40 MG/1; MG/1; MG/1
CAPSULE ORAL
Refills: 0 | Status: ACTIVE | COMMUNITY

## 2023-11-29 RX ORDER — ATORVASTATIN CALCIUM 10 MG/1
10 TABLET, FILM COATED ORAL
Refills: 0 | Status: ACTIVE | COMMUNITY

## 2023-11-29 RX ORDER — METOPROLOL TARTRATE 100 MG/1
100 TABLET ORAL
Refills: 0 | Status: ACTIVE | COMMUNITY

## 2023-11-30 LAB
ALBUMIN SERPL ELPH-MCNC: 3.9 G/DL
ALP BLD-CCNC: 95 U/L
ALT SERPL-CCNC: 11 U/L
ANION GAP SERPL CALC-SCNC: 15 MMOL/L
AST SERPL-CCNC: 7 U/L
BILIRUB SERPL-MCNC: 0.3 MG/DL
BUN SERPL-MCNC: 16 MG/DL
CALCIUM SERPL-MCNC: 9.2 MG/DL
CHLORIDE SERPL-SCNC: 99 MMOL/L
CHOLEST SERPL-MCNC: 126 MG/DL
CO2 SERPL-SCNC: 25 MMOL/L
CREAT SERPL-MCNC: 0.64 MG/DL
CRP SERPL-MCNC: 22 MG/L
EGFR: 92 ML/MIN/1.73M2
ERYTHROCYTE [SEDIMENTATION RATE] IN BLOOD BY WESTERGREN METHOD: 69 MM/HR
GLUCOSE SERPL-MCNC: 219 MG/DL
HBV CORE IGG+IGM SER QL: NONREACTIVE
HBV SURFACE AB SER QL: NONREACTIVE
HBV SURFACE AG SER QL: NONREACTIVE
HCT VFR BLD CALC: 36.8 %
HCV AB SER QL: NONREACTIVE
HCV S/CO RATIO: 0.05 S/CO
HDLC SERPL-MCNC: 61 MG/DL
HGB BLD-MCNC: 11.9 G/DL
LDLC SERPL CALC-MCNC: 41 MG/DL
MCHC RBC-ENTMCNC: 27.8 PG
MCHC RBC-ENTMCNC: 32.3 GM/DL
MCV RBC AUTO: 86 FL
NONHDLC SERPL-MCNC: 65 MG/DL
PLATELET # BLD AUTO: 338 K/UL
POTASSIUM SERPL-SCNC: 4.4 MMOL/L
PROT SERPL-MCNC: 6.7 G/DL
RBC # BLD: 4.28 M/UL
RBC # FLD: 13.1 %
SODIUM SERPL-SCNC: 139 MMOL/L
TRIGL SERPL-MCNC: 145 MG/DL
WBC # FLD AUTO: 13.36 K/UL

## 2023-11-30 RX ORDER — ACETAMINOPHEN 500 MG/1
500 TABLET ORAL
Refills: 0 | Status: ACTIVE | OUTPATIENT
Start: 2023-11-30 | End: 1900-01-01

## 2023-12-04 LAB
M TB IFN-G BLD-IMP: NEGATIVE
QUANTIFERON TB PLUS MITOGEN MINUS NIL: 1.42 IU/ML
QUANTIFERON TB PLUS NIL: 0.04 IU/ML
QUANTIFERON TB PLUS TB1 MINUS NIL: -0.02 IU/ML
QUANTIFERON TB PLUS TB2 MINUS NIL: -0.02 IU/ML

## 2023-12-05 ENCOUNTER — NON-APPOINTMENT (OUTPATIENT)
Age: 78
End: 2023-12-05

## 2023-12-05 ENCOUNTER — APPOINTMENT (OUTPATIENT)
Dept: VASCULAR SURGERY | Facility: CLINIC | Age: 78
End: 2023-12-05
Payer: MEDICARE

## 2023-12-05 ENCOUNTER — APPOINTMENT (OUTPATIENT)
Dept: OPHTHALMOLOGY | Facility: CLINIC | Age: 78
End: 2023-12-05
Payer: MEDICARE

## 2023-12-05 VITALS
TEMPERATURE: 97.4 F | BODY MASS INDEX: 32.47 KG/M2 | WEIGHT: 172 LBS | HEIGHT: 61 IN | HEART RATE: 76 BPM | SYSTOLIC BLOOD PRESSURE: 129 MMHG | DIASTOLIC BLOOD PRESSURE: 69 MMHG

## 2023-12-05 DIAGNOSIS — Z98.890 OTHER SPECIFIED POSTPROCEDURAL STATES: ICD-10-CM

## 2023-12-05 DIAGNOSIS — Z78.9 OTHER SPECIFIED HEALTH STATUS: ICD-10-CM

## 2023-12-05 DIAGNOSIS — Z86.79 PERSONAL HISTORY OF OTHER DISEASES OF THE CIRCULATORY SYSTEM: ICD-10-CM

## 2023-12-05 PROCEDURE — 99024 POSTOP FOLLOW-UP VISIT: CPT

## 2023-12-05 PROCEDURE — 92134 CPTRZ OPH DX IMG PST SGM RTA: CPT

## 2023-12-05 PROCEDURE — 92004 COMPRE OPH EXAM NEW PT 1/>: CPT

## 2023-12-13 PROCEDURE — 80061 LIPID PANEL: CPT

## 2023-12-13 PROCEDURE — 88313 SPECIAL STAINS GROUP 2: CPT

## 2023-12-13 PROCEDURE — 86140 C-REACTIVE PROTEIN: CPT

## 2023-12-13 PROCEDURE — 36415 COLL VENOUS BLD VENIPUNCTURE: CPT

## 2023-12-13 PROCEDURE — 82164 ANGIOTENSIN I ENZYME TEST: CPT

## 2023-12-13 PROCEDURE — 83540 ASSAY OF IRON: CPT

## 2023-12-13 PROCEDURE — 85730 THROMBOPLASTIN TIME PARTIAL: CPT

## 2023-12-13 PROCEDURE — 88305 TISSUE EXAM BY PATHOLOGIST: CPT

## 2023-12-13 PROCEDURE — 80053 COMPREHEN METABOLIC PANEL: CPT

## 2023-12-13 PROCEDURE — C1889: CPT

## 2023-12-13 PROCEDURE — 82962 GLUCOSE BLOOD TEST: CPT

## 2023-12-13 PROCEDURE — 82784 ASSAY IGA/IGD/IGG/IGM EACH: CPT

## 2023-12-13 PROCEDURE — 99285 EMERGENCY DEPT VISIT HI MDM: CPT

## 2023-12-13 PROCEDURE — 85610 PROTHROMBIN TIME: CPT

## 2023-12-13 PROCEDURE — 86235 NUCLEAR ANTIGEN ANTIBODY: CPT

## 2023-12-13 PROCEDURE — 70496 CT ANGIOGRAPHY HEAD: CPT | Mod: MA

## 2023-12-13 PROCEDURE — 93306 TTE W/DOPPLER COMPLETE: CPT

## 2023-12-13 PROCEDURE — 80048 BASIC METABOLIC PNL TOTAL CA: CPT

## 2023-12-13 PROCEDURE — 82550 ASSAY OF CK (CPK): CPT

## 2023-12-13 PROCEDURE — 84443 ASSAY THYROID STIM HORMONE: CPT

## 2023-12-13 PROCEDURE — 96374 THER/PROPH/DIAG INJ IV PUSH: CPT

## 2023-12-13 PROCEDURE — 84439 ASSAY OF FREE THYROXINE: CPT

## 2023-12-13 PROCEDURE — 70498 CT ANGIOGRAPHY NECK: CPT | Mod: MA

## 2023-12-13 PROCEDURE — 70553 MRI BRAIN STEM W/O & W/DYE: CPT | Mod: MA

## 2023-12-13 PROCEDURE — 86480 TB TEST CELL IMMUN MEASURE: CPT

## 2023-12-13 PROCEDURE — 82787 IGG 1 2 3 OR 4 EACH: CPT

## 2023-12-13 PROCEDURE — 76376 3D RENDER W/INTRP POSTPROCES: CPT

## 2023-12-13 PROCEDURE — 86038 ANTINUCLEAR ANTIBODIES: CPT

## 2023-12-13 PROCEDURE — 93005 ELECTROCARDIOGRAM TRACING: CPT

## 2023-12-13 PROCEDURE — 86036 ANCA SCREEN EACH ANTIBODY: CPT

## 2023-12-13 PROCEDURE — 93356 MYOCRD STRAIN IMG SPCKL TRCK: CPT

## 2023-12-13 PROCEDURE — 85027 COMPLETE CBC AUTOMATED: CPT

## 2023-12-13 PROCEDURE — 84480 ASSAY TRIIODOTHYRONINE (T3): CPT

## 2023-12-13 PROCEDURE — 70543 MRI ORBT/FAC/NCK W/O &W/DYE: CPT | Mod: MA

## 2023-12-13 PROCEDURE — 83516 IMMUNOASSAY NONANTIBODY: CPT

## 2023-12-13 PROCEDURE — A9585: CPT

## 2023-12-13 PROCEDURE — 86225 DNA ANTIBODY NATIVE: CPT

## 2023-12-13 PROCEDURE — 82728 ASSAY OF FERRITIN: CPT

## 2023-12-13 PROCEDURE — 86803 HEPATITIS C AB TEST: CPT

## 2023-12-13 PROCEDURE — 93998 UNLISTD NONINVAS VASC DX STD: CPT

## 2023-12-13 PROCEDURE — 84436 ASSAY OF TOTAL THYROXINE: CPT

## 2023-12-13 PROCEDURE — 85652 RBC SED RATE AUTOMATED: CPT

## 2023-12-13 PROCEDURE — 83036 HEMOGLOBIN GLYCOSYLATED A1C: CPT

## 2023-12-26 ENCOUNTER — APPOINTMENT (OUTPATIENT)
Dept: RHEUMATOLOGY | Facility: CLINIC | Age: 78
End: 2023-12-26
Payer: MEDICARE

## 2023-12-26 VITALS
OXYGEN SATURATION: 98 % | SYSTOLIC BLOOD PRESSURE: 127 MMHG | HEART RATE: 77 BPM | DIASTOLIC BLOOD PRESSURE: 78 MMHG | TEMPERATURE: 96.1 F | RESPIRATION RATE: 17 BRPM

## 2023-12-26 VITALS
OXYGEN SATURATION: 98 % | RESPIRATION RATE: 17 BRPM | DIASTOLIC BLOOD PRESSURE: 74 MMHG | SYSTOLIC BLOOD PRESSURE: 129 MMHG | TEMPERATURE: 98 F | HEART RATE: 71 BPM

## 2023-12-26 PROCEDURE — 96374 THER/PROPH/DIAG INJ IV PUSH: CPT | Mod: 59

## 2023-12-26 PROCEDURE — 96413 CHEMO IV INFUSION 1 HR: CPT

## 2023-12-26 RX ORDER — DIPHENHYDRAMINE HYDROCHLORIDE 50 MG/ML
50 INJECTION, SOLUTION INTRAMUSCULAR; INTRAVENOUS
Qty: 1 | Refills: 0 | Status: COMPLETED
Start: 2023-11-30

## 2023-12-26 RX ORDER — TOCILIZUMAB 20 MG/ML
200 INJECTION, SOLUTION, CONCENTRATE INTRAVENOUS
Qty: 1 | Refills: 0 | Status: COMPLETED | OUTPATIENT
Start: 2023-12-22

## 2023-12-26 RX ORDER — TOCILIZUMAB 20 MG/ML
80 INJECTION, SOLUTION, CONCENTRATE INTRAVENOUS
Qty: 1 | Refills: 0 | Status: COMPLETED
Start: 2023-12-18

## 2023-12-26 RX ORDER — DIPHENHYDRAMINE HYDROCHLORIDE 50 MG/ML
50 INJECTION, SOLUTION INTRAMUSCULAR; INTRAVENOUS
Refills: 0 | Status: ACTIVE | OUTPATIENT
Start: 2023-11-30

## 2023-12-26 RX ORDER — ACETAMINOPHEN 500 MG/1
500 TABLET ORAL
Qty: 0 | Refills: 0 | Status: COMPLETED
Start: 2023-11-30

## 2023-12-26 NOTE — HISTORY OF PRESENT ILLNESS
[Denies] : Denies [No] : No [Yes] : Yes [Declined] : Declined [TB] : Tuberculosis screening [Hep acute panel] : Hepatitis acute panel [Left upper extremity] : Left upper extremity [24g] : 24g [Start Time: ___] : Medication Start Time: [unfilled] [End Time: ___] : Medication End Time: [unfilled] [Medication Name: ___] : Medication Name: [unfilled] [Total Amount Administered: ___] : Total Amount Administered: [unfilled] [IV discontinued. Intact. No signs or symptoms of IV complications noted. Time: ___] : IV discontinued. Intact. No signs or symptoms of IV complications noted. Time: [unfilled] [Patient  instructed to seek medical attention with signs and symptoms of adverse effects] : Patient  instructed to seek medical attention with signs and symptoms of adverse effects [Patient left unit in no acute distress] : Patient left unit in no acute distress [Medications administered as ordered and tolerated well.] : Medications administered as ordered and tolerated well. [de-identified] : 11:00 AM [de-identified] : Patient kept for observation for 30 mins post infusion, no adverse effects noted.

## 2024-01-03 ENCOUNTER — APPOINTMENT (OUTPATIENT)
Dept: RHEUMATOLOGY | Facility: CLINIC | Age: 79
End: 2024-01-03
Payer: MEDICARE

## 2024-01-03 VITALS
TEMPERATURE: 97.1 F | BODY MASS INDEX: 31.34 KG/M2 | WEIGHT: 166 LBS | OXYGEN SATURATION: 97 % | HEIGHT: 61 IN | HEART RATE: 78 BPM | DIASTOLIC BLOOD PRESSURE: 60 MMHG | SYSTOLIC BLOOD PRESSURE: 128 MMHG | RESPIRATION RATE: 16 BRPM

## 2024-01-03 DIAGNOSIS — Z79.52 ENCOUNTER FOR THERAPEUTIC DRUG LVL MONITORING: ICD-10-CM

## 2024-01-03 DIAGNOSIS — Z51.81 ENCOUNTER FOR THERAPEUTIC DRUG LVL MONITORING: ICD-10-CM

## 2024-01-03 PROCEDURE — 99214 OFFICE O/P EST MOD 30 MIN: CPT

## 2024-01-03 RX ORDER — OMEPRAZOLE 20 MG/1
20 CAPSULE, DELAYED RELEASE ORAL DAILY
Qty: 30 | Refills: 1 | Status: ACTIVE | COMMUNITY
Start: 2023-11-29 | End: 1900-01-01

## 2024-01-03 RX ORDER — PREDNISONE 10 MG/1
10 TABLET ORAL
Qty: 100 | Refills: 0 | Status: ACTIVE | COMMUNITY
Start: 2024-01-03 | End: 1900-01-01

## 2024-01-03 NOTE — HISTORY OF PRESENT ILLNESS
[FreeTextEntry1] : 78-year-old female with a past medical history of type 2 diabetes, hypertension, hyperlipidemia, hypothyroidism, A-fib on Eliquis and bilateral cataract surgery coming in for management of GCA.   Patient was at [facility] from 11/12 to 11/15/2023 in the setting of 1 episode of transient right vision loss (resolved without intervention after 10 to 15-minute) and 3 weeks of episodic headaches/generalized scalp pain with sensitivity to touch along with elevated ESR (50) and CRP (23). 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 discharge. Of note, she did have high blood sugars in the setting of steroid use in the hospital   Of note patient also has dental issues and with infections in all gums pending extraction of her teeth. Per patient her dentist did not think she needed antibiotics for that   Was discharged on 11/15 on prednisone 60mg and was taking it until 11/26; ran out of medication and by Thursday pain started coming back from temple area all the way down to her jaw and she went to the hospital on Sunday evening 11/26 ([facility]) and they gave her steroids again  (60mg)      Denies  headache, vision loss, or vision changes.   Patient denies joint pains, joint swelling fever, chills, weight loss, chest pain, abdominal pain cough, SOB, diarrhea, constipation, blood in stool, rash, headaches, eye pain/redness, vision changes, myalgias,    Established outpatient care with rheumatology on 11/29/2023   She was started on IV Actemra (subcutaneous not approved by her insurance)   She received her first dose on 12/26/2023   Now comes for a follow-up   She took prednisone 60 mg x 4 weeks   She transitioned to prednisone 40 mg 4 weeks   Now on prednisone 30mg  since 12/27/23    Patient denies vision changes, headaches, jaw claudication, PMR symptoms No issue with tapering down prednisone She did have a dental procedure recently without any complications and is pending extraction of upper teeth on 1/12/24  Labs      11/11/2023 ESR 50 CRP 20   11/12/2023 ESR 76, CRP 23     11/2023: Negative/normal lipid panel, hep C antibody, QuantiFERON indeterminate (while on steroid), IgG subclass, GEOVANI, Rm, RNP,, double-stranded DNA, p-ANCA, c-ANCA MPO/MO-3, ACE,      11/15/2023: WBC 13 (while on steroid), hemoglobin 12.3, platelet 363, BMP normal , creatinine 0.84   11/29/2023 normal negative QuantiFERON hepatitis ESR 69 CRP 22, CMP CBC stable (WBC 13 in the setting of steroids)  Imaging      11.2023      Temporal artery ultrasound showed periarterial edema surrounding right temporal artery   right temporal artery biopsy 11/14 (artery with mild to moderate intimal hyperplasia and medial calcification, adjacent small vessel with minimal adventitial inflammation) negative      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            FINDINGS:   Lungs/Pleura: Azygos lobe. Minimal linear atelectasis at the left lower      lung. No focal consolidation or sizable pleural effusion. No pneumothorax.   Heart/Mediastinum: Heart size is inadequately assessed on single view AP      film.   Osseous Structures: No acute findings. Bilateral rotator cuff suture anchors      noted.      IMPRESSION:      No acute cardiopulmonary finding.

## 2024-01-03 NOTE — ASSESSMENT
[FreeTextEntry1] : Patient was at [facility] from 11/12 to 11/15/2023 in the setting of 1 episode of transient right vision loss (resolved without intervention after 10 to 15-minute) and 3 weeks of episodic headaches/generalized scalp pain with sensitivity to touch along with elevated ESR (50) and CRP (23). 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 discharge on 11/15. Of note, she did have high blood sugars in the setting of steroid use in the hospital  Of note patient also has dental issues and with infections in all gums pending extraction of her teeth. Per patient her dentist did not think she needed antibiotics for that and recommended extractions  Was discharged on 11/15 on prednisone 60mg, per patient she ran out of medication from 11/23-11/26 prompting ER visit at [facility] due to temple/jaw pain and was placed back on prednisone 60mg  Established care with rheumatology for management of GCA (negative biopsy) on 11/29/23 with prednisone tapering and starting Actemra.  Has received Actemra on 12/26/2023   - Discussed in detail with patient diagnosis of GCA based on symptoms of headaches, scalp tenderness, vision changes, age, elevated ESR/CRP. Imaging and biopsy did not confirm GCA although she did have perineuritis on MRI orbits, Temporal artery ultrasound showed periarterial edema surrounding right temporal artery without typical halo sign   -Explained to patient that temporal artery biopsy can be negative in the setting of steroids  -Patient consented to IV Actemra as steroid sparing agent (subcutaneous not covered by insurance)  -Side effects of Actemra were discussed with the pt in detail including increased risk of infection, GI perforation, hepatotoxicity, neutropenia, thrombocytopenia. No history of diverticulitis. Advised pt to seek medical care ASAP if she has an infection and advised to hold the medication until infection resolves.  -First dose Actemra 12/26/23, next dose 1/24/24 ; Reported she had no side effects and tolerated well Will evaluate with frequent monitoring of CBC, LFTs, lipid panel.  -hepatitis , quantiferon neg , normal lipid panel  -Prednisone taper: Patient s/p 2-week Of prednisone 60 mg, then transitioned to prednisone 40 mg daily for 4 weeks, prednisone 30 mg daily for 2 weeks (until 1/9/24), then taper to prednisone 20 mg daily (1/10-1/26/24) then taper to 10mg daily until follow up (1/27/24 to follow up in 6 weeks from now)  -Pt is on long-term steroid therapy and was advised on the risk of long-term steroids including but not limited to osteonecrosis, peptic ulcers/erosive gastritis, elevated blood pressure, elevated blood sugar, weight gain, osteoporosis, cushingoid features, cataracts, glaucoma, infections. Pt was advised to monitor blood sugar and blood pressure routinely. Plan is to taper prednisone quickly and safely once she starts the Actemra   Daily PPI while on prednisone, also recommended OTC vit D for bone health while on prednsione  Discussed above plan with patient as well as daughter and they both agree to above     Total time spent in review of patient history, clinical exam, management, counseling, and plan of care:  32 min  ====

## 2024-01-03 NOTE — PHYSICAL EXAM
[TextEntry] : GENERAL: Appears in no acute distress HEENT: No temporal or jaw tenderness b/l, No scalp tenderness EOMI. No conjunctival erythema. Moist mucous membranes. No nasopharyngeal ulcers. chronic dental /gum issues NECK: Supple, no cervical lymphadenopathy CARDIOVASCULAR: RRR. S1, S2 auscultated. PULMONARY: Clear to auscultation b/l ABDOMINAL: Soft, nontender, nondistended. MSK: No active synovitis, swelling, erythema, or warmth. No joint tenderness to palpation. No deformities. Normal ROM of b/l upper and lower extremities. SKIN: No lesions or rashes observed NEURO: No focal deficits. Motor strength 5/5 in major muscle groups of b/l UE and LE. Sensation to soft touch intact in major dermatomes of b/l UE and LE. PSYCH: Normal affect and thought process. Other:Pulses 2+ in b/l temporal, carotid, radial, PT, DP arteries No carotid bruits

## 2024-01-04 ENCOUNTER — NON-APPOINTMENT (OUTPATIENT)
Age: 79
End: 2024-01-04

## 2024-01-04 LAB
ALBUMIN SERPL ELPH-MCNC: 3.9 G/DL
ALP BLD-CCNC: 83 U/L
ALT SERPL-CCNC: 18 U/L
ANION GAP SERPL CALC-SCNC: 15 MMOL/L
AST SERPL-CCNC: 10 U/L
BILIRUB SERPL-MCNC: 0.4 MG/DL
BUN SERPL-MCNC: 23 MG/DL
CALCIUM SERPL-MCNC: 8.9 MG/DL
CHLORIDE SERPL-SCNC: 94 MMOL/L
CO2 SERPL-SCNC: 24 MMOL/L
CREAT SERPL-MCNC: 0.66 MG/DL
CRP SERPL-MCNC: 5 MG/L
EGFR: 91 ML/MIN/1.73M2
ERYTHROCYTE [SEDIMENTATION RATE] IN BLOOD BY WESTERGREN METHOD: 14 MM/HR
GLUCOSE SERPL-MCNC: 473 MG/DL
HCT VFR BLD CALC: 40.2 %
HGB BLD-MCNC: 13.1 G/DL
MCHC RBC-ENTMCNC: 28.2 PG
MCHC RBC-ENTMCNC: 32.6 GM/DL
MCV RBC AUTO: 86.6 FL
PLATELET # BLD AUTO: 306 K/UL
POTASSIUM SERPL-SCNC: 4.8 MMOL/L
PROT SERPL-MCNC: 6 G/DL
RBC # BLD: 4.64 M/UL
RBC # FLD: 13.8 %
SODIUM SERPL-SCNC: 133 MMOL/L
WBC # FLD AUTO: 14.9 K/UL

## 2024-01-10 ENCOUNTER — APPOINTMENT (OUTPATIENT)
Dept: OPHTHALMOLOGY | Facility: CLINIC | Age: 79
End: 2024-01-10
Payer: MEDICARE

## 2024-01-10 ENCOUNTER — NON-APPOINTMENT (OUTPATIENT)
Age: 79
End: 2024-01-10

## 2024-01-10 PROCEDURE — 92134 CPTRZ OPH DX IMG PST SGM RTA: CPT

## 2024-01-10 PROCEDURE — 99214 OFFICE O/P EST MOD 30 MIN: CPT

## 2024-01-10 PROCEDURE — 92083 EXTENDED VISUAL FIELD XM: CPT

## 2024-01-24 ENCOUNTER — APPOINTMENT (OUTPATIENT)
Dept: RHEUMATOLOGY | Facility: CLINIC | Age: 79
End: 2024-01-24
Payer: MEDICARE

## 2024-01-24 ENCOUNTER — MED ADMIN CHARGE (OUTPATIENT)
Age: 79
End: 2024-01-24

## 2024-01-24 VITALS
TEMPERATURE: 96.8 F | SYSTOLIC BLOOD PRESSURE: 130 MMHG | HEART RATE: 75 BPM | OXYGEN SATURATION: 96 % | DIASTOLIC BLOOD PRESSURE: 76 MMHG | RESPIRATION RATE: 18 BRPM

## 2024-01-24 PROCEDURE — 96374 THER/PROPH/DIAG INJ IV PUSH: CPT | Mod: 59

## 2024-01-24 PROCEDURE — 96413 CHEMO IV INFUSION 1 HR: CPT

## 2024-01-24 RX ORDER — ACETAMINOPHEN 500 MG/1
500 TABLET ORAL
Qty: 0 | Refills: 0 | Status: COMPLETED
Start: 2023-11-30

## 2024-01-24 RX ORDER — TOCILIZUMAB 20 MG/ML
80 INJECTION, SOLUTION, CONCENTRATE INTRAVENOUS
Qty: 1 | Refills: 0 | Status: COMPLETED
Start: 2023-12-18

## 2024-01-24 RX ORDER — DIPHENHYDRAMINE HYDROCHLORIDE 50 MG/ML
50 INJECTION, SOLUTION INTRAMUSCULAR; INTRAVENOUS
Qty: 1 | Refills: 0 | Status: COMPLETED
Start: 2023-11-30

## 2024-01-24 RX ORDER — TOCILIZUMAB 20 MG/ML
200 INJECTION, SOLUTION, CONCENTRATE INTRAVENOUS
Qty: 1 | Refills: 0 | Status: COMPLETED | OUTPATIENT
Start: 2024-01-19

## 2024-01-24 NOTE — HISTORY OF PRESENT ILLNESS
[Denies] : Denies [No] : No [Yes] : Yes [Declined] : Declined [TB] : Tuberculosis screening [Hep acute panel] : Hepatitis acute panel [Left upper extremity] : Left upper extremity [24g] : 24g [Start Time: ___] : Medication Start Time: [unfilled] [End Time: ___] : Medication End Time: [unfilled] [Medication Name: ___] : Medication Name: [unfilled] [Total Amount Administered: ___] : Total Amount Administered: [unfilled] [IV discontinued. Intact. No signs or symptoms of IV complications noted. Time: ___] : IV discontinued. Intact. No signs or symptoms of IV complications noted. Time: [unfilled] [Patient  instructed to seek medical attention with signs and symptoms of adverse effects] : Patient  instructed to seek medical attention with signs and symptoms of adverse effects [Patient left unit in no acute distress] : Patient left unit in no acute distress [Medications administered as ordered and tolerated well.] : Medications administered as ordered and tolerated well. [de-identified] : 08:35 AM

## 2024-02-08 LAB — HBA1C MFR BLD HPLC: 7.3

## 2024-02-09 ENCOUNTER — APPOINTMENT (OUTPATIENT)
Dept: ENDOCRINOLOGY | Facility: CLINIC | Age: 79
End: 2024-02-09
Payer: MEDICARE

## 2024-02-09 VITALS
HEART RATE: 56 BPM | SYSTOLIC BLOOD PRESSURE: 150 MMHG | BODY MASS INDEX: 32.1 KG/M2 | DIASTOLIC BLOOD PRESSURE: 72 MMHG | WEIGHT: 170 LBS | OXYGEN SATURATION: 97 % | HEIGHT: 61 IN

## 2024-02-09 DIAGNOSIS — Z86.39 PERSONAL HISTORY OF OTHER ENDOCRINE, NUTRITIONAL AND METABOLIC DISEASE: ICD-10-CM

## 2024-02-09 DIAGNOSIS — Z56.0 UNEMPLOYMENT, UNSPECIFIED: ICD-10-CM

## 2024-02-09 DIAGNOSIS — Z63.4 DISAPPEARANCE AND DEATH OF FAMILY MEMBER: ICD-10-CM

## 2024-02-09 DIAGNOSIS — Z87.39 PERSONAL HISTORY OF OTHER DISEASES OF THE MUSCULOSKELETAL SYSTEM AND CONNECTIVE TISSUE: ICD-10-CM

## 2024-02-09 DIAGNOSIS — Z78.9 OTHER SPECIFIED HEALTH STATUS: ICD-10-CM

## 2024-02-09 LAB — GLUCOSE BLDC GLUCOMTR-MCNC: 147

## 2024-02-09 PROCEDURE — 82962 GLUCOSE BLOOD TEST: CPT

## 2024-02-09 PROCEDURE — 99205 OFFICE O/P NEW HI 60 MIN: CPT

## 2024-02-09 RX ORDER — PEN NEEDLE, DIABETIC 32GX 5/32"
32G X 4 MM NEEDLE, DISPOSABLE MISCELLANEOUS
Qty: 1 | Refills: 1 | Status: ACTIVE | COMMUNITY
Start: 2024-02-09 | End: 1900-01-01

## 2024-02-09 RX ORDER — (INSULIN DEGLUDEC AND LIRAGLUTIDE) 100; 3.6 [IU]/ML; MG/ML
100-3.6 INJECTION, SOLUTION SUBCUTANEOUS
Qty: 1 | Refills: 1 | Status: ACTIVE | COMMUNITY
Start: 1900-01-01 | End: 1900-01-01

## 2024-02-09 SDOH — SOCIAL STABILITY - SOCIAL INSECURITY: DISSAPEARANCE AND DEATH OF FAMILY MEMBER: Z63.4

## 2024-02-09 SDOH — ECONOMIC STABILITY - INCOME SECURITY: UNEMPLOYMENT, UNSPECIFIED: Z56.0

## 2024-02-09 NOTE — ASSESSMENT
[FreeTextEntry1] :  Tati is a 78 yr old female, who presents today for a consultation in regards type 2 diabetes.   Per patient , has been diabetic since 30 + years.   Currently taking Xultophy 100/3.6 FS in office 147 current severity: uncontrolled A1C 7.3% in 11/23     Medication changes: To go up on Xultophy to 16 units daily  To check sugars 4 x a day  at different times. Diet and exercise reviewed. Hypoglycemia reviewed.  Eyes: no  active issues,   no known retinopathy, has GC arteritis on treatment  Feet: no active issues, no neuropathy.  Diabetic foot care reviewed.  Lipids:  on statin/ anti-lipid treatment. Last LDL:  41  Renal/ B/P : B/P wnl , on ACE/ ARB. will check for proteinuria.   Weight:     Overweight  . Balanced diet and exercise reviewed.  Hypothyroidism  : will check TFTs and adjust dose as needed. Discussed with patient how to take thyroid medication appropriately,empty stomach , all Multi vitamins  4 to 6 hrs away form thyroid medication, he voiced understanding.   Diabetes counselling:  The patient was counseled on diabetes foot care, long term vascular complications of diabetes, carbohydrate consistent diet, importance of diet and exercise to improve glycemic control, achieve weight loss and improve cardiovascular health and action and use of short and long-acting insulin. Patient was referred to ophthalmology for retinopathy screening. ADA diet (30-50 gm carbohydrates with each meal, 15 grams with snacks. Balance with lean proteins and low fat diet.) Exercise daily per ability, work up to 30 minutes a day. Medication, risks and benefits reviewed. Glucose testing and insulin administration for glycemic management.   FOLLOWUP@ 3 months

## 2024-02-09 NOTE — PHYSICAL EXAM
[Alert] : alert [Well Nourished] : well nourished [No Acute Distress] : no acute distress [Normal Sclera/Conjunctiva] : normal sclera/conjunctiva [No Neck Mass] : no neck mass was observed [Supple] : the neck was supple [Thyroid Not Enlarged] : the thyroid was not enlarged [No Respiratory Distress] : no respiratory distress [No Accessory Muscle Use] : no accessory muscle use [Clear to Auscultation] : lungs were clear to auscultation bilaterally [Normal S1, S2] : normal S1 and S2 [Normal Rate] : heart rate was normal [Regular Rhythm] : with a regular rhythm [Normal Bowel Sounds] : normal bowel sounds [Not Tender] : non-tender [Soft] : abdomen soft [No Stigmata of Cushings Syndrome] : no stigmata of Cushings Syndrome [Normal Gait] : normal gait [Normal] : normal [Diminished Throughout Both Feet] : normal tactile sensation with monofilament testing throughout both feet [No Tremors] : no tremors [Oriented x3] : oriented to person, place, and time [Normal Affect] : the affect was normal [Normal Insight/Judgement] : insight and judgment were intact [Normal Mood] : the mood was normal

## 2024-02-09 NOTE — REVIEW OF SYSTEMS
[Fatigue] : fatigue [Decreased Appetite] : appetite not decreased [Recent Weight Gain (___ Lbs)] : no recent weight gain [Recent Weight Loss (___ Lbs)] : no recent weight loss [Visual Field Defect] : no visual field defect [Dry Eyes] : no dryness [Dysphagia] : no dysphagia [Chest Pain] : no chest pain [Palpitations] : no palpitations [Lower Ext Edema] : no lower extremity edema [Shortness Of Breath] : no shortness of breath [Cough] : no cough [Nausea] : no nausea [Constipation] : no constipation [Abdominal Pain] : no abdominal pain [Vomiting] : no vomiting [Diarrhea] : no diarrhea [Polyuria] : no polyuria [Joint Pain] : no joint pain [Muscle Weakness] : no muscle weakness [Myalgia] : myalgia  [Acanthosis] : no acanthosis  [Acne] : no acne [Headaches] : no headaches [Dizziness] : dizziness [Tremors] : no tremors [Depression] : no depression [Insomnia] : no insomnia [Polydipsia] : no polydipsia [Cold Intolerance] : no cold intolerance [Heat Intolerance] : no heat intolerance [Easy Bruising] : no tendency for easy bruising [Swelling] : no swelling

## 2024-02-09 NOTE — HISTORY OF PRESENT ILLNESS
[FreeTextEntry1] : Tati is a 78 yr old female, who presents today for a consultation in regards type 2 diabetes.   Per patient , has been diabetic since 30 + years.  She was in [facility]  for Giant cell arteritis in 11/23. On prednisone, has been on it since 10/23, now on 10 mg daily. No Family history  of diabetes.  Currently taking Xultophy 100/3.6 FS in office 147 current severity: uncontrolled A1C 7.3% in 11/23     Home Glucose Monitoring Frequency: 3 x a day BG Documentation:  per recall BG Readings -   Fasting  120s to 130s                              Lunch 130s  , mostly below 200s                               HS  150s to 160s. says her prednisone dose is now lower and her sugars better.       Before that she was getting high upto 300s.  Diet is good.All teeth pulled, on soft diet   Diabetic History ER Visits:  none Hospitalizations:   none Diet Therapy: now watching his diet Diabetic Education:   no Exercise:   very  active,walks a lot Last eye exam: gets yearly,   Has known h/o hypothyroidism for many years, on replacement. Denies missing doses.   [Today's Date] : [unfilled] [Humalog] : Humalog [___] : [unfilled] units of insulin pre-bedtime [Carbohydrate Ratio:                  1 unit for every ___ grams of carbohydrates] : Carbohydrate Ratio: 1 unit for every [unfilled] grams of carbohydrates [BG Target = ____] : BG Target = [unfilled] [Insulin Sensitivity Factor = ____] : Insulin Sensitivity Factor = [unfilled] [FreeTextEntry5] : Basaglar

## 2024-02-14 ENCOUNTER — APPOINTMENT (OUTPATIENT)
Dept: RHEUMATOLOGY | Facility: CLINIC | Age: 79
End: 2024-02-14
Payer: MEDICARE

## 2024-02-14 VITALS
OXYGEN SATURATION: 99 % | SYSTOLIC BLOOD PRESSURE: 120 MMHG | HEIGHT: 61 IN | TEMPERATURE: 97.2 F | DIASTOLIC BLOOD PRESSURE: 68 MMHG | BODY MASS INDEX: 32.1 KG/M2 | HEART RATE: 62 BPM | WEIGHT: 170 LBS

## 2024-02-14 DIAGNOSIS — Z92.241 PERSONAL HISTORY OF SYSTEMIC STEROID THERAPY: ICD-10-CM

## 2024-02-14 DIAGNOSIS — R70.0 ELEVATED ERYTHROCYTE SEDIMENTATION RATE: ICD-10-CM

## 2024-02-14 PROCEDURE — G2211 COMPLEX E/M VISIT ADD ON: CPT

## 2024-02-14 PROCEDURE — 99214 OFFICE O/P EST MOD 30 MIN: CPT

## 2024-02-14 NOTE — HISTORY OF PRESENT ILLNESS
[FreeTextEntry1] : 78-year-old female with a past medical history of type 2 diabetes, hypertension, hyperlipidemia, hypothyroidism, A-fib on Eliquis and bilateral cataract surgery coming in for management of GCA.   Patient was at [facility] from 11/12 to 11/15/2023 in the setting of 1 episode of transient right vision loss (resolved without intervention after 10 to 15-minute) and 3 weeks of episodic headaches/generalized scalp pain with sensitivity to touch along with elevated ESR (50) and CRP (23). 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 discharge.    Was discharged on 11/15 on prednisone 60mg and was taking it until 11/26; ran out of medication and by Thursday pain started coming back from temple area all the way down to her jaw and she went to the hospital on Sunday evening 11/26 ([facility]) and they gave her steroids again (60mg)      Denies headache, vision loss, or vision changes.   Patient denies joint pains, joint swelling fever, chills, weight loss, chest pain, abdominal pain cough, SOB, diarrhea, constipation, blood in stool, rash, headaches, eye pain/redness, vision changes, myalgias,      Of note patient also has dental issues and with infections in all gums pending extraction of her teeth. Per patient her dentist did not think she needed antibiotics for that      Established outpatient care with rheumatology on 11/29/2023      She was started on IV Actemra (subcutaneous not approved by her insurance)      She received her first dose on 12/26/2023      She took prednisone 60 mg x 4 weeks,  prednisone 40 mg 4 weeks,  prednisone 30 mg daily for 2 weeks , then prednisone 20 mg daily for 2 weeks  then taper to 10mg daily until follow up (from 1/27/24 to now)  Patient denies vision changes, headaches,  PMR symptoms  No issue with tapering down prednisone .Her fingersticks are better She did have a dental procedure recently With removal of all teeth and pending dentures; Since then she reports some jaw pain; denies jaw claudication however she has not been chewing She does have some dizziness when she gets up from a seated position; will be seeing her PCP and ENT  Labs     11/11/2023 ESR 50 CRP 20   11/12/2023 ESR 76, CRP 23   11/2023: Negative/normal lipid panel, hep C antibody, QuantiFERON indeterminate (while on steroid), IgG subclass, GEOVANI, Rm, RNP,, double-stranded DNA, p-ANCA, c-ANCA MPO/CA-3, ACE,            11/15/2023: WBC 13 (while on steroid), hemoglobin 12.3, platelet 363, BMP normal , creatinine 0.84      11/29/2023 normal negative QuantiFERON hepatitis   ESR 69 CRP 22,   CMP CBC stable (WBC 13 in the setting of steroids)      1/3/24   ESR 14 CRP 5   CMP with >400 glucose CBC stable (WBC 14.9)    Imaging            11.2023            Temporal artery ultrasound showed periarterial edema surrounding right temporal artery      right temporal artery biopsy 11/14 (artery with mild to moderate intimal hyperplasia and medial calcification, adjacent small vessel with minimal adventitial inflammation) negative            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                        FINDINGS:      Lungs/Pleura: Azygos lobe. Minimal linear atelectasis at the left lower            lung. No focal consolidation or sizable pleural effusion. No pneumothorax.      Heart/Mediastinum: Heart size is inadequately assessed on single view AP            film.      Osseous Structures: No acute findings. Bilateral rotator cuff suture anchors            noted.            IMPRESSION:            No acute cardiopulmonary finding.

## 2024-02-14 NOTE — ASSESSMENT
[FreeTextEntry1] :   78-year-old female with a past medical history of type 2 diabetes, hypertension, hyperlipidemia, hypothyroidism, A-fib on Eliquis and bilateral cataract surgery coming in for management of GCA.   Patient was at [facility] from 11/12 to 11/15/2023 in the setting of 1 episode of transient right vision loss (resolved without intervention after 10 to 15-minute) and 3 weeks of episodic headaches/generalized scalp pain with sensitivity to touch along with elevated ESR (50) and CRP (23). 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 discharge on 11/15. Of note, she did have high blood sugars in the setting of steroid use in the hospital   Was discharged on 11/15 on prednisone 60mg, per patient she ran out of medication from 11/23-11/26 prompting ER visit at [facility] due to temple/jaw pain and was placed back on prednisone 60mg   Established care with rheumatology for management of GCA (negative biopsy) on 11/29/23 with prednisone tapering and starting Actemra.   Has been started on Actemra as of 12/26/2023     - Discussed in detail with patient diagnosis of GCA based on symptoms of headaches, scalp tenderness, vision changes, age, elevated ESR/CRP. Imaging and biopsy did not confirm GCA although she did have perineuritis on MRI orbits, Temporal artery ultrasound showed periarterial edema surrounding right temporal artery without typical halo sign   -Explained to patient that temporal artery biopsy can be negative in the setting of steroids   -Patient consented to IV Actemra as steroid sparing agent (subcutaneous not covered by insurance)   -Side effects of Actemra were discussed with the pt in detail including increased risk of infection, GI perforation, hepatotoxicity, neutropenia, thrombocytopenia. No history of diverticulitis. Advised pt to seek medical care ASAP if she has an infection and advised to hold the medication until infection resolves.   -First dose Actemra 12/26/23,  1/24/24 , next dose 2/21/24; Reported she had no side effects and tolerated well   Will evaluate with frequent monitoring of CBC, LFTs, lipid panel.   -hepatitis , quantiferon neg , normal lipid panel   -Obtain ESR, CRP, CMP, CBC now   -Prednisone taper: Patient s/p 2-week Of prednisone 60 mg, then transitioned to prednisone 40 mg daily for 4 weeks, prednisone 30 mg daily for 2 weeks (until 1/9/24), then taper to prednisone 20 mg daily (1/10-1/26/24) then taper to 10mg daily  (1/27/24 - now) -Next taper:  decrease to prednisone 5mg daily for 2 weeks then stop  -Pt is on long-term steroid therapy and was advised on the risk of long-term steroids including but not limited to osteonecrosis, peptic ulcers/erosive gastritis, elevated blood pressure, elevated blood sugar, weight gain, osteoporosis, cushingoid features, cataracts, glaucoma, infections. Pt was advised to monitor blood sugar and blood pressure routinely. Plan is to taper prednisone quickly and safely once she starts the Actemra   -Daily PPI while on prednisone, also recommended OTC vit D for bone health while on prednsione   -Patient will be following up with ENT and PCP for dizziness when she gets up from a seated position. Advised to stay hydrated neeta now since she has no teeth and is pending dentures.  Discussed above plan with patient as well as daughter and they both agree to above   Informed of red flags to monitor for and let me know if any changes in clinical condition Total time spent in review of patient history, clinical exam, management, counseling, and plan of care: 32 min   Follow up 2 months

## 2024-02-14 NOTE — PHYSICAL EXAM
[TextEntry] : GENERAL: Appears in no acute distress HEENT: No temporal or jaw tenderness b/l, No scalp tenderness EOMI. No conjunctival erythema. Moist mucous membranes. No nasopharyngeal ulcers. NECK: Supple, no cervical lymphadenopathy CARDIOVASCULAR: RRR. S1, S2 auscultated. PULMONARY: Clear to auscultation b/l ABDOMINAL: Soft, nontender, nondistended. MSK: No active synovitis, swelling, erythema, or warmth. No joint tenderness to palpation. No deformities. Normal ROM of b/l upper and lower extremities. SKIN: No lesions or rashes observed NEURO: No focal deficits. Motor strength 5/5 in major muscle groups of b/l UE and LE. Sensation to soft touch intact in major dermatomes of b/l UE and LE. PSYCH: Normal affect and thought process. Other:Pulses 2+ in b/l temporal, carotid, radial, PT, DP arteries No carotid bruits

## 2024-02-15 ENCOUNTER — TRANSCRIPTION ENCOUNTER (OUTPATIENT)
Age: 79
End: 2024-02-15

## 2024-02-15 LAB
ALBUMIN SERPL ELPH-MCNC: 4.4 G/DL
ALP BLD-CCNC: 68 U/L
ALT SERPL-CCNC: 23 U/L
ANION GAP SERPL CALC-SCNC: 13 MMOL/L
AST SERPL-CCNC: 20 U/L
BILIRUB SERPL-MCNC: 0.6 MG/DL
BUN SERPL-MCNC: 14 MG/DL
CALCIUM SERPL-MCNC: 9.7 MG/DL
CHLORIDE SERPL-SCNC: 103 MMOL/L
CO2 SERPL-SCNC: 25 MMOL/L
CREAT SERPL-MCNC: 0.79 MG/DL
CRP SERPL-MCNC: <3 MG/L
EGFR: 77 ML/MIN/1.73M2
ERYTHROCYTE [SEDIMENTATION RATE] IN BLOOD BY WESTERGREN METHOD: 2 MM/HR
GLUCOSE SERPL-MCNC: 205 MG/DL
HCT VFR BLD CALC: 39.6 %
HGB BLD-MCNC: 13.3 G/DL
MCHC RBC-ENTMCNC: 29.1 PG
MCHC RBC-ENTMCNC: 33.6 GM/DL
MCV RBC AUTO: 86.7 FL
PLATELET # BLD AUTO: 247 K/UL
POTASSIUM SERPL-SCNC: 4.3 MMOL/L
PROT SERPL-MCNC: 6.6 G/DL
RBC # BLD: 4.57 M/UL
RBC # FLD: 14.8 %
SODIUM SERPL-SCNC: 141 MMOL/L
WBC # FLD AUTO: 9.94 K/UL

## 2024-02-15 RX ORDER — PREDNISONE 5 MG/1
5 TABLET ORAL
Qty: 14 | Refills: 0 | Status: ACTIVE | COMMUNITY
Start: 2024-02-15 | End: 1900-01-01

## 2024-02-21 ENCOUNTER — APPOINTMENT (OUTPATIENT)
Dept: RHEUMATOLOGY | Facility: CLINIC | Age: 79
End: 2024-02-21

## 2024-03-05 RX ORDER — TOCILIZUMAB 180 MG/ML
162 INJECTION, SOLUTION SUBCUTANEOUS
Qty: 2 | Refills: 6 | Status: DISCONTINUED | COMMUNITY
Start: 2023-11-29 | End: 2024-03-05

## 2024-03-05 RX ORDER — PREDNISONE 20 MG/1
20 TABLET ORAL
Qty: 80 | Refills: 0 | Status: DISCONTINUED | COMMUNITY
Start: 2023-11-29 | End: 2024-03-05

## 2024-03-07 ENCOUNTER — APPOINTMENT (OUTPATIENT)
Dept: RHEUMATOLOGY | Facility: CLINIC | Age: 79
End: 2024-03-07
Payer: MEDICARE

## 2024-03-07 VITALS
HEART RATE: 60 BPM | DIASTOLIC BLOOD PRESSURE: 56 MMHG | RESPIRATION RATE: 18 BRPM | OXYGEN SATURATION: 99 % | SYSTOLIC BLOOD PRESSURE: 100 MMHG

## 2024-03-07 VITALS
OXYGEN SATURATION: 94 % | HEART RATE: 79 BPM | DIASTOLIC BLOOD PRESSURE: 75 MMHG | TEMPERATURE: 96.1 F | SYSTOLIC BLOOD PRESSURE: 126 MMHG | RESPIRATION RATE: 18 BRPM

## 2024-03-07 PROCEDURE — 96374 THER/PROPH/DIAG INJ IV PUSH: CPT | Mod: 59

## 2024-03-07 PROCEDURE — 96413 CHEMO IV INFUSION 1 HR: CPT

## 2024-03-07 RX ORDER — DIPHENHYDRAMINE HYDROCHLORIDE 50 MG/ML
50 INJECTION, SOLUTION INTRAMUSCULAR; INTRAVENOUS
Qty: 1 | Refills: 0 | Status: COMPLETED
Start: 2023-11-30

## 2024-03-07 RX ORDER — ACETAMINOPHEN 500 MG/1
500 TABLET ORAL
Qty: 0 | Refills: 0 | Status: COMPLETED
Start: 2023-11-30

## 2024-03-07 RX ORDER — TOCILIZUMAB 20 MG/ML
80 INJECTION, SOLUTION, CONCENTRATE INTRAVENOUS
Qty: 1 | Refills: 0 | Status: COMPLETED
Start: 2023-12-18

## 2024-03-07 RX ORDER — TOCILIZUMAB 20 MG/ML
200 INJECTION, SOLUTION, CONCENTRATE INTRAVENOUS
Qty: 1 | Refills: 0 | Status: COMPLETED
Start: 2023-11-30

## 2024-03-07 NOTE — HISTORY OF PRESENT ILLNESS
[Denies] : Denies [No] : No [Yes] : Yes [Declined] : Declined [TB] : Tuberculosis screening [Hep acute panel] : Hepatitis acute panel [Left upper extremity] : Left upper extremity [24g] : 24g [Start Time: ___] : Medication Start Time: [unfilled] [End Time: ___] : Medication End Time: [unfilled] [Total Amount Administered: ___] : Total Amount Administered: [unfilled] [Medication Name: ___] : Medication Name: [unfilled] [IV discontinued. Intact. No signs or symptoms of IV complications noted. Time: ___] : IV discontinued. Intact. No signs or symptoms of IV complications noted. Time: [unfilled] [Patient  instructed to seek medical attention with signs and symptoms of adverse effects] : Patient  instructed to seek medical attention with signs and symptoms of adverse effects [Medications administered as ordered and tolerated well.] : Medications administered as ordered and tolerated well. [Patient left unit in no acute distress] : Patient left unit in no acute distress [de-identified] : Mild generalized pain reported [de-identified] : 08:35 am

## 2024-03-13 RX ORDER — BLOOD SUGAR DIAGNOSTIC
STRIP MISCELLANEOUS
Qty: 300 | Refills: 3 | Status: ACTIVE | COMMUNITY
Start: 2024-03-13 | End: 1900-01-01

## 2024-03-13 RX ORDER — LANCETS 33 GAUGE
EACH MISCELLANEOUS
Qty: 300 | Refills: 0 | Status: ACTIVE | COMMUNITY
Start: 2024-03-13 | End: 1900-01-01

## 2024-03-13 RX ORDER — BLOOD-GLUCOSE METER
W/DEVICE EACH MISCELLANEOUS
Qty: 1 | Refills: 0 | Status: ACTIVE | COMMUNITY
Start: 2024-03-13 | End: 1900-01-01

## 2024-04-10 ENCOUNTER — APPOINTMENT (OUTPATIENT)
Dept: RHEUMATOLOGY | Facility: CLINIC | Age: 79
End: 2024-04-10
Payer: MEDICARE

## 2024-04-10 ENCOUNTER — APPOINTMENT (OUTPATIENT)
Dept: FAMILY MEDICINE | Facility: CLINIC | Age: 79
End: 2024-04-10
Payer: MEDICARE

## 2024-04-10 ENCOUNTER — MED ADMIN CHARGE (OUTPATIENT)
Age: 79
End: 2024-04-10

## 2024-04-10 VITALS
TEMPERATURE: 97.6 F | DIASTOLIC BLOOD PRESSURE: 71 MMHG | SYSTOLIC BLOOD PRESSURE: 120 MMHG | RESPIRATION RATE: 16 BRPM | BODY MASS INDEX: 32.1 KG/M2 | WEIGHT: 170 LBS | HEIGHT: 61 IN | OXYGEN SATURATION: 69 % | HEART RATE: 74 BPM

## 2024-04-10 VITALS
TEMPERATURE: 96.7 F | DIASTOLIC BLOOD PRESSURE: 72 MMHG | SYSTOLIC BLOOD PRESSURE: 123 MMHG | RESPIRATION RATE: 18 BRPM | HEART RATE: 69 BPM | OXYGEN SATURATION: 98 %

## 2024-04-10 VITALS
OXYGEN SATURATION: 100 % | RESPIRATION RATE: 18 BRPM | SYSTOLIC BLOOD PRESSURE: 110 MMHG | HEART RATE: 61 BPM | DIASTOLIC BLOOD PRESSURE: 62 MMHG

## 2024-04-10 DIAGNOSIS — Z12.11 ENCOUNTER FOR SCREENING FOR MALIGNANT NEOPLASM OF COLON: ICD-10-CM

## 2024-04-10 DIAGNOSIS — Z76.89 PERSONS ENCOUNTERING HEALTH SERVICES IN OTHER SPECIFIED CIRCUMSTANCES: ICD-10-CM

## 2024-04-10 DIAGNOSIS — E78.5 HYPERLIPIDEMIA, UNSPECIFIED: ICD-10-CM

## 2024-04-10 DIAGNOSIS — Z12.39 ENCOUNTER FOR OTHER SCREENING FOR MALIGNANT NEOPLASM OF BREAST: ICD-10-CM

## 2024-04-10 PROCEDURE — 96413 CHEMO IV INFUSION 1 HR: CPT

## 2024-04-10 PROCEDURE — 96374 THER/PROPH/DIAG INJ IV PUSH: CPT | Mod: 59

## 2024-04-10 PROCEDURE — 99204 OFFICE O/P NEW MOD 45 MIN: CPT

## 2024-04-10 RX ORDER — ACETAMINOPHEN 500 MG/1
500 TABLET ORAL
Qty: 0 | Refills: 0 | Status: COMPLETED
Start: 2023-11-30

## 2024-04-10 RX ORDER — TOCILIZUMAB 20 MG/ML
200 INJECTION, SOLUTION, CONCENTRATE INTRAVENOUS
Qty: 1 | Refills: 0 | Status: COMPLETED | OUTPATIENT
Start: 2024-03-15

## 2024-04-10 RX ORDER — DIPHENHYDRAMINE HYDROCHLORIDE 50 MG/ML
50 INJECTION, SOLUTION INTRAMUSCULAR; INTRAVENOUS
Qty: 1 | Refills: 0 | Status: COMPLETED
Start: 2023-11-30

## 2024-04-10 RX ORDER — TOCILIZUMAB 20 MG/ML
80 INJECTION, SOLUTION, CONCENTRATE INTRAVENOUS
Qty: 1 | Refills: 0 | Status: COMPLETED
Start: 2023-12-18

## 2024-04-10 NOTE — HISTORY OF PRESENT ILLNESS
[N/A] : N/A [Denies] : Denies [No] : No [Yes] : Yes [Informed consent documented in EHR.] : Informed consent documented in EHR. [TB] : Tuberculosis screening [Hep acute panel] : Hepatitis acute panel [Left upper extremity] : Left upper extremity [22g] : 22g [Medication Name: ___] : Medication Name: [unfilled] [Start Time: ___] : Medication Start Time: [unfilled] [End Time: ___] : Medication End Time: [unfilled] [Total Amount Administered: ___] : Total Amount Administered: [unfilled] [IV discontinued. Intact. No signs or symptoms of IV complications noted. Time: ___] : IV discontinued. Intact. No signs or symptoms of IV complications noted. Time: [unfilled] [Patient  instructed to seek medical attention with signs and symptoms of adverse effects] : Patient  instructed to seek medical attention with signs and symptoms of adverse effects [Patient left unit in no acute distress] : Patient left unit in no acute distress [Medications administered as ordered and tolerated well.] : Medications administered as ordered and tolerated well. [de-identified] : 8:40 AM

## 2024-04-17 ENCOUNTER — APPOINTMENT (OUTPATIENT)
Dept: RHEUMATOLOGY | Facility: CLINIC | Age: 79
End: 2024-04-17
Payer: MEDICARE

## 2024-04-17 VITALS
SYSTOLIC BLOOD PRESSURE: 124 MMHG | HEIGHT: 61 IN | DIASTOLIC BLOOD PRESSURE: 58 MMHG | OXYGEN SATURATION: 99 % | WEIGHT: 170 LBS | HEART RATE: 64 BPM | BODY MASS INDEX: 32.1 KG/M2 | TEMPERATURE: 97.4 F

## 2024-04-17 DIAGNOSIS — Z51.81 ENCOUNTER FOR THERAPEUTIC DRUG LVL MONITORING: ICD-10-CM

## 2024-04-17 DIAGNOSIS — Z79.899 ENCOUNTER FOR THERAPEUTIC DRUG LVL MONITORING: ICD-10-CM

## 2024-04-17 DIAGNOSIS — M31.6 OTHER GIANT CELL ARTERITIS: ICD-10-CM

## 2024-04-17 PROCEDURE — 99214 OFFICE O/P EST MOD 30 MIN: CPT

## 2024-04-17 PROCEDURE — G2211 COMPLEX E/M VISIT ADD ON: CPT

## 2024-04-17 NOTE — REASON FOR VISIT
[Follow-Up: _____] : a [unfilled] follow-up visit [FreeTextEntry1] : [facility]

## 2024-04-17 NOTE — ASSESSMENT
[FreeTextEntry1] : 78-year-old female with a past medical history of type 2 diabetes, hypertension, hyperlipidemia, hypothyroidism, A-fib on Eliquis and bilateral cataract surgery coming in for management of GCA.   Patient was at [facility] from 11/12 to 11/15/2023 in the setting of 1 episode of transient right vision loss (resolved without intervention after 10 to 15-minute) and 3 weeks of episodic headaches/generalized scalp pain with sensitivity to touch along with elevated ESR (50) and CRP (23). 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 discharge on 11/15. Of note, she did have high blood sugars in the setting of steroid use in the hospital   Established care with rheumatology for management of GCA (negative biopsy) on 11/29/23 with prednisone tapering and starting Actemra.   Has been started on Actemra as of 12/26/2023   - Discussed in detail with patient diagnosis of GCA based on symptoms of headaches, scalp tenderness, vision changes, age, elevated ESR/CRP. Imaging and biopsy did not confirm GCA although she did have perineuritis on MRI orbits, Temporal artery ultrasound showed periarterial edema surrounding right temporal artery without typical halo sign   -Explained to patient that temporal artery biopsy can be negative in the setting of steroids   -Patient consented to IV Actemra as steroid sparing agent (subcutaneous not covered by insurance)   -Side effects of Actemra were discussed with the pt in detail including increased risk of infection, GI perforation, hepatotoxicity, neutropenia, thrombocytopenia. No history of diverticulitis. Advised pt to seek medical care ASAP if she has an infection and advised to hold the medication until infection resolves.   -First dose Actemra 12/26/23, 1/24/24 , 3/7/24, 4/10/24; Reported she had no side effects and tolerated well   Will evaluate with frequent monitoring of CBC, LFTs,  -hepatitis , quantiferon neg , normal lipid panel   -Obtain ESR, CRP, CMP, CBC (she is getting labs in 5/2023 )   -Prednisone taper: Patient s/p 2-week Of prednisone 60 mg, then transitioned to prednisone 40 mg daily for 4 weeks, prednisone 30 mg daily for 2 weeks (until 1/9/24), then taper to prednisone 20 mg daily (1/10-1/26/24) then taper to 10mg daily (1/27/24 -2/14/24) then tapered to prednisone 5mg 2 weeks , now off     Discussed above plan with patient as well as daughter and they both agree to above  Informed of red flags to monitor for and let me know if any changes in clinical condition  Total time spent in review of patient history, clinical exam, management, counseling, and plan of care  30 min ===

## 2024-04-17 NOTE — HISTORY OF PRESENT ILLNESS
[FreeTextEntry1] : 78-year-old female with a past medical history of type 2 diabetes, hypertension, hyperlipidemia, hypothyroidism, A-fib on Eliquis and bilateral cataract surgery coming in for f/u of GCA.   Patient was at [facility] from 11/12 to 11/15/2023 in the setting of 1 episode of transient right vision loss (resolved without intervention after 10 to 15-minute) and 3 weeks of episodic headaches/generalized scalp pain with sensitivity to touch along with elevated ESR (50) and CRP (23). 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 discharge.  Was discharged on 11/15 on prednisone 60mg   Established outpatient care with rheumatology on 11/29/2023    She was started on IV Actemra (subcutaneous not approved by her insurance)  She received her first dose on 12/26/2023  She took prednisone 60 mg x 4 weeks, prednisone 40 mg 4 weeks, prednisone 30 mg daily for 2 weeks , then prednisone 20 mg daily for 2 weeks then taper to 10mg daily until follow up from 1/27/24 to 2/14/24 then prednisone 5mg for 2 weeks then stopped   Denies headache, vision loss, or vision changes, PMR symptoms Patient denies joint pains, joint swelling fever, chills, weight loss, chest pain, abdominal pain cough, SOB, diarrhea, constipation, blood in stool, rash, headaches, eye pain/redness, vision changes, myalgias, jaw claudication.      Labs   11/11/2023 ESR 50 CRP 20   11/12/2023 ESR 76, CRP 23   11/2023: Negative/normal lipid panel, hep C antibody, QuantiFERON indeterminate (while on steroid), IgG subclass, GEOVANI, Rm, RNP,, double-stranded DNA, p-ANCA, c-ANCA MPO/OH-3, ACE,    11/15/2023: WBC 13 (while on steroid), hemoglobin 12.3, platelet 363, BMP normal , creatinine 0.84    11/29/2023 normal negative QuantiFERON hepatitis   ESR 69 CRP 22,      CMP CBC stable (WBC 13 in the setting of steroids)      1/3/24 ESR 14 CRP 5   2/2024: ESR2  CRP <3      Imaging   11.2023   Temporal artery ultrasound showed periarterial edema surrounding right temporal artery   right temporal artery biopsy 11/14 (artery with mild to moderate intimal hyperplasia and medial calcification, adjacent small vessel with minimal adventitial inflammation) negative      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      FINDINGS:      Lungs/Pleura: Azygos lobe. Minimal linear atelectasis at the left lower lung. No focal consolidation or sizable pleural effusion. No pneumothorax.   Heart/Mediastinum: Heart size is inadequately assessed on single view AP film.   Osseous Structures: No acute findings. Bilateral rotator cuff suture anchors noted.      IMPRESSION:   No acute cardiopulmonary finding.

## 2024-04-17 NOTE — PHYSICAL EXAM
[TextEntry] : GENERAL: Appears in no acute distress HEENT: No temporal or jaw tenderness b/l, No scalp tenderness EOMI. No conjunctival erythema. Moist mucous membranes. No nasopharyngeal ulcers. NECK: Supple, no cervical lymphadenopathy CARDIOVASCULAR: RRR. S1, S2 auscultated. PULMONARY: Clear to auscultation b/l ABDOMINAL: Soft, nontender, nondistended. MSK: No active synovitis, swelling, erythema, or warmth. No joint tenderness to palpation. No deformities. Normal ROM of b/l upper and lower extremities. SKIN: No lesions or rashes observed NEURO: No focal deficits. Motor strength 5/5 in major muscle groups of b/l UE and LE. Sensation to soft touch intact in major dermatomes of b/l UE and LE. PSYCH: Normal affect and thought process. Other:Pulses 2+ in b/l tempora, radial arteries No carotid bruits

## 2024-05-06 RX ORDER — FERROUS SULFATE TAB EC 325 MG (65 MG FE EQUIVALENT) 325 (65 FE) MG
325 (65 FE) TABLET DELAYED RESPONSE ORAL DAILY
Qty: 90 | Refills: 0 | Status: ACTIVE | COMMUNITY
Start: 1900-01-01 | End: 1900-01-01

## 2024-05-08 ENCOUNTER — MED ADMIN CHARGE (OUTPATIENT)
Age: 79
End: 2024-05-08

## 2024-05-08 ENCOUNTER — APPOINTMENT (OUTPATIENT)
Dept: RHEUMATOLOGY | Facility: CLINIC | Age: 79
End: 2024-05-08
Payer: MEDICARE

## 2024-05-08 VITALS
HEART RATE: 64 BPM | OXYGEN SATURATION: 98 % | SYSTOLIC BLOOD PRESSURE: 112 MMHG | DIASTOLIC BLOOD PRESSURE: 64 MMHG | RESPIRATION RATE: 18 BRPM

## 2024-05-08 VITALS
OXYGEN SATURATION: 98 % | TEMPERATURE: 96.7 F | HEART RATE: 70 BPM | SYSTOLIC BLOOD PRESSURE: 148 MMHG | DIASTOLIC BLOOD PRESSURE: 74 MMHG | RESPIRATION RATE: 18 BRPM

## 2024-05-08 PROCEDURE — 96374 THER/PROPH/DIAG INJ IV PUSH: CPT | Mod: 59

## 2024-05-08 PROCEDURE — 96413 CHEMO IV INFUSION 1 HR: CPT

## 2024-05-08 RX ORDER — TOCILIZUMAB 20 MG/ML
80 INJECTION, SOLUTION, CONCENTRATE INTRAVENOUS
Qty: 1 | Refills: 0 | Status: COMPLETED
Start: 2023-12-18

## 2024-05-08 RX ORDER — DIPHENHYDRAMINE HYDROCHLORIDE 50 MG/ML
50 INJECTION, SOLUTION INTRAMUSCULAR; INTRAVENOUS
Qty: 1 | Refills: 0 | Status: COMPLETED
Start: 2023-11-30

## 2024-05-08 RX ORDER — ACETAMINOPHEN 500 MG/1
500 TABLET ORAL
Qty: 0 | Refills: 0 | Status: COMPLETED
Start: 2023-11-30

## 2024-05-08 RX ORDER — TOCILIZUMAB 20 MG/ML
200 INJECTION, SOLUTION, CONCENTRATE INTRAVENOUS
Qty: 1 | Refills: 0 | Status: COMPLETED | OUTPATIENT
Start: 2024-04-12

## 2024-05-08 NOTE — HISTORY OF PRESENT ILLNESS
[Denies] : Denies [No] : No [Yes] : Yes [Declined] : Declined [TB] : Tuberculosis screening [Hep acute panel] : Hepatitis acute panel [Left upper extremity] : Left upper extremity [24g] : 24g [Start Time: ___] : Medication Start Time: [unfilled] [End Time: ___] : Medication End Time: [unfilled] [Medication Name: ___] : Medication Name: [unfilled] [Total Amount Administered: ___] : Total Amount Administered: [unfilled] [IV discontinued. Intact. No signs or symptoms of IV complications noted. Time: ___] : IV discontinued. Intact. No signs or symptoms of IV complications noted. Time: [unfilled] [Patient  instructed to seek medical attention with signs and symptoms of adverse effects] : Patient  instructed to seek medical attention with signs and symptoms of adverse effects [Patient left unit in no acute distress] : Patient left unit in no acute distress [Medications administered as ordered and tolerated well.] : Medications administered as ordered and tolerated well. [de-identified] : Reports mild generalized pain to joints.  [de-identified] : 09:35 am

## 2024-05-09 LAB
ALBUMIN SERPL ELPH-MCNC: 4.3 G/DL
ALP BLD-CCNC: 85 U/L
ALT SERPL-CCNC: 16 U/L
ANION GAP SERPL CALC-SCNC: 9 MMOL/L
AST SERPL-CCNC: 15 U/L
BILIRUB SERPL-MCNC: 0.5 MG/DL
BUN SERPL-MCNC: 23 MG/DL
CALCIUM SERPL-MCNC: 9.1 MG/DL
CHLORIDE SERPL-SCNC: 106 MMOL/L
CO2 SERPL-SCNC: 26 MMOL/L
CREAT SERPL-MCNC: 0.77 MG/DL
CRP SERPL-MCNC: <3 MG/L
EGFR: 80 ML/MIN/1.73M2
ERYTHROCYTE [SEDIMENTATION RATE] IN BLOOD BY WESTERGREN METHOD: 2 MM/HR
GLUCOSE SERPL-MCNC: 237 MG/DL
HCT VFR BLD CALC: 39.3 %
HGB BLD-MCNC: 12.6 G/DL
MCHC RBC-ENTMCNC: 28.9 PG
MCHC RBC-ENTMCNC: 32.1 GM/DL
MCV RBC AUTO: 90.1 FL
PLATELET # BLD AUTO: 174 K/UL
POTASSIUM SERPL-SCNC: 4.7 MMOL/L
PROT SERPL-MCNC: 6.1 G/DL
RBC # BLD: 4.36 M/UL
RBC # FLD: 12.3 %
SODIUM SERPL-SCNC: 141 MMOL/L
WBC # FLD AUTO: 3.94 K/UL

## 2024-05-23 ENCOUNTER — APPOINTMENT (OUTPATIENT)
Dept: ENDOCRINOLOGY | Facility: CLINIC | Age: 79
End: 2024-05-23

## 2024-05-31 RX ORDER — TOCILIZUMAB 20 MG/ML
80 INJECTION, SOLUTION, CONCENTRATE INTRAVENOUS
Refills: 0 | Status: ACTIVE | OUTPATIENT
Start: 2023-12-18

## 2024-05-31 RX ORDER — TOCILIZUMAB 20 MG/ML
200 INJECTION, SOLUTION, CONCENTRATE INTRAVENOUS
Refills: 0 | Status: ACTIVE | OUTPATIENT
Start: 2023-11-30

## 2024-06-03 ENCOUNTER — LABORATORY RESULT (OUTPATIENT)
Age: 79
End: 2024-06-03

## 2024-06-03 ENCOUNTER — RESULT CHARGE (OUTPATIENT)
Age: 79
End: 2024-06-03

## 2024-06-03 ENCOUNTER — APPOINTMENT (OUTPATIENT)
Dept: ENDOCRINOLOGY | Facility: CLINIC | Age: 79
End: 2024-06-03
Payer: MEDICARE

## 2024-06-03 ENCOUNTER — NON-APPOINTMENT (OUTPATIENT)
Age: 79
End: 2024-06-03

## 2024-06-03 VITALS
DIASTOLIC BLOOD PRESSURE: 48 MMHG | BODY MASS INDEX: 31.53 KG/M2 | OXYGEN SATURATION: 98 % | HEIGHT: 61 IN | SYSTOLIC BLOOD PRESSURE: 90 MMHG | WEIGHT: 167 LBS | HEART RATE: 116 BPM

## 2024-06-03 DIAGNOSIS — Z79.4 TYPE 2 DIABETES MELLITUS W/OUT COMPLICATIONS: ICD-10-CM

## 2024-06-03 DIAGNOSIS — E11.9 TYPE 2 DIABETES MELLITUS W/OUT COMPLICATIONS: ICD-10-CM

## 2024-06-03 DIAGNOSIS — E03.9 HYPOTHYROIDISM, UNSPECIFIED: ICD-10-CM

## 2024-06-03 LAB — GLUCOSE BLDC GLUCOMTR-MCNC: 173

## 2024-06-03 PROCEDURE — 36415 COLL VENOUS BLD VENIPUNCTURE: CPT

## 2024-06-03 PROCEDURE — 82962 GLUCOSE BLOOD TEST: CPT

## 2024-06-03 PROCEDURE — 99214 OFFICE O/P EST MOD 30 MIN: CPT

## 2024-06-03 NOTE — ASSESSMENT
[Diabetes Foot Care] : diabetes foot care [Long Term Vascular Complications] : long term vascular complications of diabetes [Carbohydrate Consistent Diet] : carbohydrate consistent diet [Importance of Diet and Exercise] : importance of diet and exercise to improve glycemic control, achieve weight loss and improve cardiovascular health [Exercise/Effect on Glucose] : exercise/effect on glucose [Retinopathy Screening] : Patient was referred to ophthalmology for retinopathy screening [FreeTextEntry1] : T2DM -Reviewed risk/complication of uncontrolled DM  -Increase exercise as tolerated 5 days a week x 30 mins/day -Increase dietary efforts, low carb/low sugar -Continue to check FS twice a day and keep log, bring log to next appt -Repeat HgbA1C today -Decrease Xultophy to 12 units to prevent Hypoglycemia  Hypothyroidism  -Continue LT4 100 mcg QD, adjust dose if needed after labs -Repeat TFT's today and TPO -Will send for thyroid u/s  RTO in 3-4 months with Dr. Hart

## 2024-06-03 NOTE — HISTORY OF PRESENT ILLNESS
[FreeTextEntry1] : Interval Hx: off prednisone, now having monthly infusions for GCA,  c/o low bloos sugar 3 times last week felt dizzy She will be going to [place] for 3-4 months. No recent labs   Medical Hx: She was in [facility] for Giant cell arteritis in 11/23. On prednisone, has been on it since 10/23, now on 10 mg daily. No Family history of diabetes.   Quality: T2DM Severity: unknown  Duration: 30 + Modifying Factors: Insulon   SMBG 2 x's a day  no logs or meter As per patient AM  -140s  Current FS: 173 fasting   HgbA1C: no recent   Current Regimen: Xultophy  14 units daily  Eye Exam: 2/2024, no DR Foot Exam: denies neuopathy Kidney Disease: none Heart Disease: A-Fib, follows with PCP  Weight: stable Diet: recently got dentures, eating soft foods Exercise: house chores, babysits granchildren Smoking:  none   Hypothyroidism  -On LT4 100 mcg QD -Thinks she had nodule in past, no recent thyroid u/s

## 2024-06-03 NOTE — REASON FOR VISIT
[Follow - Up] : a follow-up visit [DM Type 2] : DM Type 2 [Hypothyroidism] : hypothyroidism [Other: _____] : [unfilled]

## 2024-06-05 ENCOUNTER — APPOINTMENT (OUTPATIENT)
Dept: RHEUMATOLOGY | Facility: CLINIC | Age: 79
End: 2024-06-05
Payer: MEDICARE

## 2024-06-05 ENCOUNTER — MED ADMIN CHARGE (OUTPATIENT)
Age: 79
End: 2024-06-05

## 2024-06-05 VITALS
TEMPERATURE: 98 F | HEART RATE: 64 BPM | SYSTOLIC BLOOD PRESSURE: 126 MMHG | OXYGEN SATURATION: 98 % | RESPIRATION RATE: 17 BRPM | DIASTOLIC BLOOD PRESSURE: 72 MMHG

## 2024-06-05 VITALS — OXYGEN SATURATION: 98 % | HEART RATE: 61 BPM | DIASTOLIC BLOOD PRESSURE: 75 MMHG | SYSTOLIC BLOOD PRESSURE: 128 MMHG

## 2024-06-05 PROCEDURE — 96413 CHEMO IV INFUSION 1 HR: CPT

## 2024-06-05 PROCEDURE — 96374 THER/PROPH/DIAG INJ IV PUSH: CPT | Mod: 59

## 2024-06-05 RX ORDER — TOCILIZUMAB 20 MG/ML
200 INJECTION, SOLUTION, CONCENTRATE INTRAVENOUS
Qty: 1 | Refills: 0 | Status: COMPLETED
Start: 2023-11-30

## 2024-06-05 RX ORDER — TOCILIZUMAB 20 MG/ML
80 INJECTION, SOLUTION, CONCENTRATE INTRAVENOUS
Qty: 1 | Refills: 0 | Status: COMPLETED
Start: 2023-12-18

## 2024-06-05 RX ORDER — DIPHENHYDRAMINE HYDROCHLORIDE 50 MG/ML
50 INJECTION, SOLUTION INTRAMUSCULAR; INTRAVENOUS
Qty: 1 | Refills: 0 | Status: COMPLETED
Start: 2023-11-30

## 2024-06-05 RX ORDER — ACETAMINOPHEN 500 MG/1
500 TABLET ORAL
Qty: 0 | Refills: 0 | Status: COMPLETED
Start: 2023-11-30

## 2024-06-05 RX ORDER — ERGOCALCIFEROL 1.25 MG/1
1.25 MG CAPSULE, LIQUID FILLED ORAL
Qty: 12 | Refills: 1 | Status: ACTIVE | COMMUNITY
Start: 2024-06-05 | End: 1900-01-01

## 2024-06-05 RX ORDER — LEVOTHYROXINE SODIUM 0.09 MG/1
88 TABLET ORAL
Qty: 90 | Refills: 1 | Status: ACTIVE | COMMUNITY
Start: 1900-01-01 | End: 1900-01-01

## 2024-06-05 NOTE — HISTORY OF PRESENT ILLNESS
[8] : 8 [Denies] : Denies [Yes] : Yes [Declined] : Declined [Right upper extremity] : Right upper extremity [22g] : 22g [Start Time: ___] : Medication Start Time: [unfilled] [End Time: ___] : Medication End Time: [unfilled] [Medication Name: ___] : Medication Name: [unfilled] [Total Amount Administered: ___] : Total Amount Administered: [unfilled] [IV discontinued. Intact. No signs or symptoms of IV complications noted. Time: ___] : IV discontinued. Intact. No signs or symptoms of IV complications noted. Time: [unfilled] [Patient  instructed to seek medical attention with signs and symptoms of adverse effects] : Patient  instructed to seek medical attention with signs and symptoms of adverse effects [Patient left unit in no acute distress] : Patient left unit in no acute distress [Medications administered as ordered and tolerated well.] : Medications administered as ordered and tolerated well. [Drug wasted: _____] : Drug wasted: [unfilled] [de-identified] : Generalized, lower back  [de-identified] : 3782 [de-identified] : pt tolerated infusion, Discussed Actemra injection with patient and family member.

## 2024-06-06 LAB
ESTIMATED AVERAGE GLUCOSE: 151 MG/DL
HBA1C MFR BLD HPLC: 6.9 %

## 2024-06-11 RX ORDER — RAMIPRIL 5 MG/1
5 CAPSULE ORAL DAILY
Qty: 1 | Refills: 0 | Status: ACTIVE | COMMUNITY
Start: 1900-01-01 | End: 1900-01-01

## 2024-06-11 RX ORDER — APIXABAN 2.5 MG/1
2.5 TABLET, FILM COATED ORAL
Qty: 180 | Refills: 0 | Status: ACTIVE | COMMUNITY
Start: 1900-01-01 | End: 1900-01-01

## 2024-07-10 ENCOUNTER — MED ADMIN CHARGE (OUTPATIENT)
Age: 79
End: 2024-07-10

## 2024-07-10 ENCOUNTER — APPOINTMENT (OUTPATIENT)
Dept: RHEUMATOLOGY | Facility: CLINIC | Age: 79
End: 2024-07-10
Payer: MEDICARE

## 2024-07-10 VITALS
TEMPERATURE: 97.9 F | SYSTOLIC BLOOD PRESSURE: 124 MMHG | DIASTOLIC BLOOD PRESSURE: 74 MMHG | RESPIRATION RATE: 18 BRPM | HEART RATE: 65 BPM | OXYGEN SATURATION: 99 %

## 2024-07-10 PROCEDURE — 96413 CHEMO IV INFUSION 1 HR: CPT

## 2024-07-10 PROCEDURE — 96374 THER/PROPH/DIAG INJ IV PUSH: CPT | Mod: 59

## 2024-07-10 RX ORDER — DIPHENHYDRAMINE HYDROCHLORIDE 50 MG/ML
50 INJECTION, SOLUTION INTRAMUSCULAR; INTRAVENOUS
Qty: 1 | Refills: 0 | Status: COMPLETED
Start: 2023-11-30

## 2024-07-10 RX ORDER — ACETAMINOPHEN 500 MG/1
500 TABLET ORAL
Qty: 0 | Refills: 0 | Status: COMPLETED
Start: 2023-11-30

## 2024-07-10 RX ORDER — TOCILIZUMAB 20 MG/ML
80 INJECTION, SOLUTION, CONCENTRATE INTRAVENOUS
Qty: 1 | Refills: 0 | Status: COMPLETED
Start: 2023-12-18

## 2024-07-31 ENCOUNTER — APPOINTMENT (OUTPATIENT)
Dept: INTERNAL MEDICINE | Facility: CLINIC | Age: 79
End: 2024-07-31
Payer: MEDICARE

## 2024-07-31 VITALS
DIASTOLIC BLOOD PRESSURE: 76 MMHG | BODY MASS INDEX: 31.34 KG/M2 | SYSTOLIC BLOOD PRESSURE: 130 MMHG | WEIGHT: 166 LBS | HEART RATE: 74 BPM | HEIGHT: 61 IN | OXYGEN SATURATION: 97 %

## 2024-07-31 DIAGNOSIS — M31.6 OTHER GIANT CELL ARTERITIS: ICD-10-CM

## 2024-07-31 DIAGNOSIS — E11.9 TYPE 2 DIABETES MELLITUS W/OUT COMPLICATIONS: ICD-10-CM

## 2024-07-31 DIAGNOSIS — Z13.820 ENCOUNTER FOR SCREENING FOR OSTEOPOROSIS: ICD-10-CM

## 2024-07-31 DIAGNOSIS — H91.90 UNSPECIFIED HEARING LOSS, UNSPECIFIED EAR: ICD-10-CM

## 2024-07-31 DIAGNOSIS — H81.10 BENIGN PAROXYSMAL VERTIGO, UNSPECIFIED EAR: ICD-10-CM

## 2024-07-31 DIAGNOSIS — I48.91 UNSPECIFIED ATRIAL FIBRILLATION: ICD-10-CM

## 2024-07-31 DIAGNOSIS — E03.9 HYPOTHYROIDISM, UNSPECIFIED: ICD-10-CM

## 2024-07-31 DIAGNOSIS — I10 ESSENTIAL (PRIMARY) HYPERTENSION: ICD-10-CM

## 2024-07-31 DIAGNOSIS — Z79.4 TYPE 2 DIABETES MELLITUS W/OUT COMPLICATIONS: ICD-10-CM

## 2024-07-31 PROCEDURE — 99215 OFFICE O/P EST HI 40 MIN: CPT

## 2024-07-31 PROCEDURE — G2211 COMPLEX E/M VISIT ADD ON: CPT

## 2024-07-31 RX ORDER — METOPROLOL TARTRATE 100 MG/1
100 TABLET, FILM COATED ORAL
Qty: 90 | Refills: 1 | Status: ACTIVE | COMMUNITY
Start: 2024-07-31 | End: 1900-01-01

## 2024-07-31 NOTE — HISTORY OF PRESENT ILLNESS
[FreeTextEntry8] : Patient is a 79-year-old with PMH DM2, HTN, HLD, hypothyroidism, afib on Eliquis, thyroid nodule, giant cell arteritis presenting with her daughter for transfer of care. Previous PCP was Dr. Franklin. Patient previously lived in [place], now living with daughter in [place]. Patient loves to garden and do yard work. Patient does a lot of physical activity as a result. Walks without assistive devices. Patient was recently diagnosed with GCA, off steroids now and on monthly infusions of Actemra. It has been almost a year with no relapse in symptoms. Patient reports frequently her BP has been low. When she takes it at home, sometimes 84 or 90 is the systolic BP. This has been happening more frequently over the past month (3 times). Typically happens in the morning before she takes her BP medication. When she sees her BP is low, she still takes the BP medication. Patient is symptomatic with dizziness, lethargy. She does not see a cardiologist. Patient's daughter also reports patient has decreased hearing and gets dizzy when she looks up at the ceiling.

## 2024-07-31 NOTE — PLAN
[FreeTextEntry1] : Cardiac: - Recommend reducing ramipril dose to 2.5 mg daily due to symptomatic hypotension. Advised patient to hold ramipril dose if BP < 100/60. - Continue metoprolol tartrate 100 mg once daily. Medication renewed at today's visit. Do not hold metoprolol due to afib - If BPs still low, can reduce metoprolol dose - Cardiology referral placed for baseline echo and for chronic conditions (afib, HTN, HLD, DM2) - On Eliquis for anticoagulation for afib  DM2: - Follows with endo - Insulin dose recently decreased to 14 units daily - Last A1C 6.9% - Recommend continued f/u with endo - Patient sees optho (no diabetic retinopathy) - eGFR WNL  Hypothyroidism: - Follows with endo - Recommend patient schedule ordered US thyroid - TSH low in June with normal T4. Levothyroxine was decreased to 88 mcg daily - Patient due for TSH to be rechecked. Patient's daughter aware and will be taking patient to [facility] to have it drawn  GCA: - Following with rheum, on Actemra - No visual changes - Will monitor closely for signs of PMR. No signs/symptoms today - Continue f/u with rheum  Hearing loss: - Patient's daughter reports patient has hearing loss - Hearing appears normal during conversation in office today - Audiology referral placed for further evaluation  Positional dizziness: - Likely BPPV - Exacerbated when extending neck - Recommend vestibular therapy to help with symptoms. Patient declining today. Will readdress at future visit  HCM: - Patient overdue for mammo/DEXA. Mammo/DEXA scripts provided to patient - Recommend cologuard for colon cancer screening. Patient would like to think about it first  f/u for CPE

## 2024-08-07 ENCOUNTER — MED ADMIN CHARGE (OUTPATIENT)
Age: 79
End: 2024-08-07

## 2024-08-07 ENCOUNTER — APPOINTMENT (OUTPATIENT)
Dept: RHEUMATOLOGY | Facility: CLINIC | Age: 79
End: 2024-08-07

## 2024-08-07 PROCEDURE — 96413 CHEMO IV INFUSION 1 HR: CPT

## 2024-08-07 PROCEDURE — 96374 THER/PROPH/DIAG INJ IV PUSH: CPT | Mod: 59

## 2024-08-07 NOTE — HISTORY OF PRESENT ILLNESS
[Denies] : Denies [No] : No [Yes] : Yes [Declined] : Declined [TB] : Tuberculosis screening [Hep acute panel] : Hepatitis acute panel [Left upper extremity] : Left upper extremity [22g] : 22g [Start Time: ___] : Medication Start Time: [unfilled] [End Time: ___] : Medication End Time: [unfilled] [Medication Name: ___] : Medication Name: [unfilled] [Total Amount Administered: ___] : Total Amount Administered: [unfilled] [IV discontinued. Intact. No signs or symptoms of IV complications noted. Time: ___] : IV discontinued. Intact. No signs or symptoms of IV complications noted. Time: [unfilled] [Patient  instructed to seek medical attention with signs and symptoms of adverse effects] : Patient  instructed to seek medical attention with signs and symptoms of adverse effects [Patient left unit in no acute distress] : Patient left unit in no acute distress [Medications administered as ordered and tolerated well.] : Medications administered as ordered and tolerated well. [de-identified] : Left AC [de-identified] : 4:10

## 2024-09-09 ENCOUNTER — RX RENEWAL (OUTPATIENT)
Age: 79
End: 2024-09-09

## 2024-09-12 NOTE — PROGRESS NOTE ADULT - ATTENDING COMMENTS
Mom stated that she is covid positive and has been out of school all week. Mom is wondering if she can get a school excuse for her.    CHEYANNE Diana MD have participated in the daily care of this patient  and have seen and examined the patient today and agree with  the  evaluation, assessment and plan of the surgical house officer  CHEYANNE Diana MD have personally seen and examined the patient at bedside today at 7  pm

## 2024-09-25 ENCOUNTER — MED ADMIN CHARGE (OUTPATIENT)
Age: 79
End: 2024-09-25

## 2024-09-25 ENCOUNTER — APPOINTMENT (OUTPATIENT)
Dept: RHEUMATOLOGY | Facility: CLINIC | Age: 79
End: 2024-09-25
Payer: MEDICARE

## 2024-09-25 VITALS
HEART RATE: 70 BPM | TEMPERATURE: 97.8 F | DIASTOLIC BLOOD PRESSURE: 72 MMHG | SYSTOLIC BLOOD PRESSURE: 142 MMHG | RESPIRATION RATE: 18 BRPM | OXYGEN SATURATION: 99 %

## 2024-09-25 PROCEDURE — 96413 CHEMO IV INFUSION 1 HR: CPT

## 2024-09-25 PROCEDURE — 96374 THER/PROPH/DIAG INJ IV PUSH: CPT | Mod: 59

## 2024-09-25 RX ORDER — DIPHENHYDRAMINE HYDROCHLORIDE 50 MG/ML
50 INJECTION, SOLUTION INTRAMUSCULAR; INTRAVENOUS
Qty: 1 | Refills: 0 | Status: COMPLETED
Start: 2023-11-30

## 2024-09-25 RX ORDER — ACETAMINOPHEN 500 MG/1
500 TABLET ORAL
Qty: 0 | Refills: 0 | Status: COMPLETED
Start: 2023-11-30

## 2024-09-25 RX ORDER — TOCILIZUMAB 20 MG/ML
80 INJECTION, SOLUTION, CONCENTRATE INTRAVENOUS
Qty: 1 | Refills: 0 | Status: COMPLETED
Start: 2023-12-18

## 2024-09-25 NOTE — HISTORY OF PRESENT ILLNESS
[Denies] : Denies [No] : No [Yes] : Yes [Declined] : Declined [TB] : Tuberculosis screening [Hep acute panel] : Hepatitis acute panel [Left upper extremity] : Left upper extremity [24g] : 24g [Start Time: ___] : Medication Start Time: [unfilled] [End Time: ___] : Medication End Time: [unfilled] [Medication Name: ___] : Medication Name: [unfilled] [Total Amount Administered: ___] : Total Amount Administered: [unfilled] [IV discontinued. Intact. No signs or symptoms of IV complications noted. Time: ___] : IV discontinued. Intact. No signs or symptoms of IV complications noted. Time: [unfilled] [Patient  instructed to seek medical attention with signs and symptoms of adverse effects] : Patient  instructed to seek medical attention with signs and symptoms of adverse effects [Patient left unit in no acute distress] : Patient left unit in no acute distress [Medications administered as ordered and tolerated well.] : Medications administered as ordered and tolerated well. [de-identified] : 08:45 AM

## 2024-09-27 RX ADMIN — TOCILIZUMAB 480 MG/10ML: 20 INJECTION, SOLUTION, CONCENTRATE INTRAVENOUS at 00:00

## 2024-10-11 ENCOUNTER — APPOINTMENT (OUTPATIENT)
Dept: RHEUMATOLOGY | Facility: CLINIC | Age: 79
End: 2024-10-11

## 2024-10-16 ENCOUNTER — NON-APPOINTMENT (OUTPATIENT)
Age: 79
End: 2024-10-16

## 2024-10-16 ENCOUNTER — APPOINTMENT (OUTPATIENT)
Dept: INTERNAL MEDICINE | Facility: CLINIC | Age: 79
End: 2024-10-16
Payer: MEDICARE

## 2024-10-16 VITALS
HEART RATE: 65 BPM | DIASTOLIC BLOOD PRESSURE: 70 MMHG | HEIGHT: 61 IN | SYSTOLIC BLOOD PRESSURE: 130 MMHG | OXYGEN SATURATION: 98 % | BODY MASS INDEX: 31.91 KG/M2 | WEIGHT: 169 LBS

## 2024-10-16 DIAGNOSIS — Z82.3 FAMILY HISTORY OF STROKE: ICD-10-CM

## 2024-10-16 DIAGNOSIS — I10 ESSENTIAL (PRIMARY) HYPERTENSION: ICD-10-CM

## 2024-10-16 DIAGNOSIS — Z12.39 ENCOUNTER FOR OTHER SCREENING FOR MALIGNANT NEOPLASM OF BREAST: ICD-10-CM

## 2024-10-16 DIAGNOSIS — Z13.820 ENCOUNTER FOR SCREENING FOR OSTEOPOROSIS: ICD-10-CM

## 2024-10-16 DIAGNOSIS — Z12.11 ENCOUNTER FOR SCREENING FOR MALIGNANT NEOPLASM OF COLON: ICD-10-CM

## 2024-10-16 DIAGNOSIS — Z12.83 ENCOUNTER FOR SCREENING FOR MALIGNANT NEOPLASM OF SKIN: ICD-10-CM

## 2024-10-16 DIAGNOSIS — I48.91 UNSPECIFIED ATRIAL FIBRILLATION: ICD-10-CM

## 2024-10-16 DIAGNOSIS — Z00.00 ENCOUNTER FOR GENERAL ADULT MEDICAL EXAMINATION W/OUT ABNORMAL FINDINGS: ICD-10-CM

## 2024-10-16 DIAGNOSIS — R94.31 ABNORMAL ELECTROCARDIOGRAM [ECG] [EKG]: ICD-10-CM

## 2024-10-16 PROCEDURE — 93000 ELECTROCARDIOGRAM COMPLETE: CPT

## 2024-10-16 PROCEDURE — G0439: CPT

## 2024-10-18 ENCOUNTER — APPOINTMENT (OUTPATIENT)
Dept: FAMILY MEDICINE | Facility: CLINIC | Age: 79
End: 2024-10-18
Payer: MEDICARE

## 2024-10-18 ENCOUNTER — LABORATORY RESULT (OUTPATIENT)
Age: 79
End: 2024-10-18

## 2024-10-18 PROCEDURE — 36415 COLL VENOUS BLD VENIPUNCTURE: CPT

## 2024-10-21 LAB
ALBUMIN SERPL ELPH-MCNC: 4.5 G/DL
ALP BLD-CCNC: 93 U/L
ALT SERPL-CCNC: 34 U/L
ANION GAP SERPL CALC-SCNC: 12 MMOL/L
APPEARANCE: CLEAR
AST SERPL-CCNC: 17 U/L
BACTERIA: NEGATIVE /HPF
BASOPHILS # BLD AUTO: 0.06 K/UL
BASOPHILS NFR BLD AUTO: 1.1 %
BILIRUB SERPL-MCNC: 0.4 MG/DL
BILIRUBIN URINE: NEGATIVE
BLOOD URINE: NEGATIVE
BUN SERPL-MCNC: 21 MG/DL
CALCIUM SERPL-MCNC: 9.1 MG/DL
CAST: 0 /LPF
CHLORIDE SERPL-SCNC: 107 MMOL/L
CHOLEST SERPL-MCNC: 125 MG/DL
CO2 SERPL-SCNC: 24 MMOL/L
COLOR: YELLOW
CREAT SERPL-MCNC: 0.75 MG/DL
EGFR: 82 ML/MIN/1.73M2
EOSINOPHIL # BLD AUTO: 0.29 K/UL
EOSINOPHIL NFR BLD AUTO: 5.4 %
EPITHELIAL CELLS: 2 /HPF
ESTIMATED AVERAGE GLUCOSE: 137 MG/DL
FOLATE SERPL-MCNC: >20 NG/ML
GLUCOSE QUALITATIVE U: NEGATIVE MG/DL
GLUCOSE SERPL-MCNC: 118 MG/DL
HBA1C MFR BLD HPLC: 6.4 %
HCT VFR BLD CALC: 40.6 %
HCV AB SER QL: NONREACTIVE
HCV S/CO RATIO: 0.07 S/CO
HDLC SERPL-MCNC: 63 MG/DL
HGB BLD-MCNC: 13.1 G/DL
IMM GRANULOCYTES NFR BLD AUTO: 0.2 %
KETONES URINE: NEGATIVE MG/DL
LDLC SERPL CALC-MCNC: 46 MG/DL
LEUKOCYTE ESTERASE URINE: NEGATIVE
LYMPHOCYTES # BLD AUTO: 1.85 K/UL
LYMPHOCYTES NFR BLD AUTO: 34.2 %
MAN DIFF?: NORMAL
MCHC RBC-ENTMCNC: 29.6 PG
MCHC RBC-ENTMCNC: 32.3 GM/DL
MCV RBC AUTO: 91.6 FL
MICROSCOPIC-UA: NORMAL
MONOCYTES # BLD AUTO: 0.65 K/UL
MONOCYTES NFR BLD AUTO: 12 %
NEUTROPHILS # BLD AUTO: 2.55 K/UL
NEUTROPHILS NFR BLD AUTO: 47.1 %
NITRITE URINE: NEGATIVE
NONHDLC SERPL-MCNC: 62 MG/DL
PH URINE: 5.5
PLATELET # BLD AUTO: 191 K/UL
POTASSIUM SERPL-SCNC: 5.1 MMOL/L
PROT SERPL-MCNC: 6.7 G/DL
PROTEIN URINE: NEGATIVE MG/DL
RBC # BLD: 4.43 M/UL
RBC # FLD: 13 %
RED BLOOD CELLS URINE: 0 /HPF
SODIUM SERPL-SCNC: 143 MMOL/L
SPECIFIC GRAVITY URINE: 1.02
TRIGL SERPL-MCNC: 80 MG/DL
TSH SERPL-ACNC: 0.21 UIU/ML
UROBILINOGEN URINE: 0.2 MG/DL
VIT B12 SERPL-MCNC: 402 PG/ML
WBC # FLD AUTO: 5.41 K/UL
WHITE BLOOD CELLS URINE: 1 /HPF

## 2024-10-23 ENCOUNTER — MED ADMIN CHARGE (OUTPATIENT)
Age: 79
End: 2024-10-23

## 2024-10-23 ENCOUNTER — APPOINTMENT (OUTPATIENT)
Dept: RHEUMATOLOGY | Facility: CLINIC | Age: 79
End: 2024-10-23
Payer: MEDICARE

## 2024-10-23 ENCOUNTER — RX RENEWAL (OUTPATIENT)
Age: 79
End: 2024-10-23

## 2024-10-23 VITALS
TEMPERATURE: 98.3 F | SYSTOLIC BLOOD PRESSURE: 167 MMHG | HEART RATE: 70 BPM | RESPIRATION RATE: 18 BRPM | DIASTOLIC BLOOD PRESSURE: 70 MMHG | OXYGEN SATURATION: 100 %

## 2024-10-23 VITALS
TEMPERATURE: 97.3 F | SYSTOLIC BLOOD PRESSURE: 148 MMHG | BODY MASS INDEX: 33.07 KG/M2 | OXYGEN SATURATION: 99 % | WEIGHT: 175 LBS | DIASTOLIC BLOOD PRESSURE: 70 MMHG | HEART RATE: 72 BPM

## 2024-10-23 DIAGNOSIS — M31.6 OTHER GIANT CELL ARTERITIS: ICD-10-CM

## 2024-10-23 DIAGNOSIS — R70.0 ELEVATED ERYTHROCYTE SEDIMENTATION RATE: ICD-10-CM

## 2024-10-23 DIAGNOSIS — Z51.81 ENCOUNTER FOR THERAPEUTIC DRUG LVL MONITORING: ICD-10-CM

## 2024-10-23 DIAGNOSIS — Z79.899 ENCOUNTER FOR THERAPEUTIC DRUG LVL MONITORING: ICD-10-CM

## 2024-10-23 PROCEDURE — 96374 THER/PROPH/DIAG INJ IV PUSH: CPT | Mod: 59

## 2024-10-23 PROCEDURE — 99214 OFFICE O/P EST MOD 30 MIN: CPT

## 2024-10-23 PROCEDURE — 96413 CHEMO IV INFUSION 1 HR: CPT

## 2024-10-23 PROCEDURE — G2211 COMPLEX E/M VISIT ADD ON: CPT

## 2024-10-23 RX ORDER — TOCILIZUMAB 20 MG/ML
80 INJECTION, SOLUTION, CONCENTRATE INTRAVENOUS
Qty: 1 | Refills: 0 | Status: COMPLETED
Start: 2023-12-18

## 2024-10-23 RX ORDER — ACETAMINOPHEN 500 MG/1
500 TABLET ORAL
Qty: 0 | Refills: 0 | Status: COMPLETED
Start: 2023-11-30

## 2024-10-23 RX ORDER — DIPHENHYDRAMINE HYDROCHLORIDE 50 MG/ML
50 INJECTION, SOLUTION INTRAMUSCULAR; INTRAVENOUS
Qty: 1 | Refills: 0 | Status: COMPLETED
Start: 2023-11-30

## 2024-10-23 RX ORDER — TOCILIZUMAB 20 MG/ML
200 INJECTION, SOLUTION, CONCENTRATE INTRAVENOUS
Qty: 1 | Refills: 0 | Status: COMPLETED
Start: 2023-12-18

## 2024-10-23 RX ORDER — PREDNISONE 10 MG/1
10 TABLET ORAL
Qty: 18 | Refills: 0 | Status: ACTIVE | COMMUNITY
Start: 2024-10-23 | End: 1900-01-01

## 2024-10-24 ENCOUNTER — APPOINTMENT (OUTPATIENT)
Dept: FAMILY MEDICINE | Facility: CLINIC | Age: 79
End: 2024-10-24

## 2024-10-24 LAB
CRP SERPL-MCNC: <3 MG/L
ERYTHROCYTE [SEDIMENTATION RATE] IN BLOOD BY WESTERGREN METHOD: 2 MM/HR
HAV IGM SER QL: NONREACTIVE
HBV CORE IGG+IGM SER QL: NONREACTIVE
HBV CORE IGM SER QL: NONREACTIVE
HBV SURFACE AG SER QL: NONREACTIVE
HCV AB SER QL: NONREACTIVE
HCV S/CO RATIO: 0.07 S/CO

## 2024-10-29 LAB
M TB IFN-G BLD-IMP: NEGATIVE
QUANTIFERON TB PLUS MITOGEN MINUS NIL: >10 IU/ML
QUANTIFERON TB PLUS NIL: 0.02 IU/ML
QUANTIFERON TB PLUS TB1 MINUS NIL: 0 IU/ML
QUANTIFERON TB PLUS TB2 MINUS NIL: 0 IU/ML

## 2025-01-15 ENCOUNTER — RX RENEWAL (OUTPATIENT)
Age: 80
End: 2025-01-15

## 2025-01-22 ENCOUNTER — APPOINTMENT (OUTPATIENT)
Dept: RHEUMATOLOGY | Facility: CLINIC | Age: 80
End: 2025-01-22
Payer: MEDICARE

## 2025-01-22 ENCOUNTER — MED ADMIN CHARGE (OUTPATIENT)
Age: 80
End: 2025-01-22

## 2025-01-22 VITALS
SYSTOLIC BLOOD PRESSURE: 121 MMHG | DIASTOLIC BLOOD PRESSURE: 62 MMHG | TEMPERATURE: 97.6 F | HEART RATE: 81 BPM | OXYGEN SATURATION: 96 %

## 2025-01-22 PROCEDURE — 96374 THER/PROPH/DIAG INJ IV PUSH: CPT | Mod: 59

## 2025-01-22 PROCEDURE — 96413 CHEMO IV INFUSION 1 HR: CPT

## 2025-01-22 RX ORDER — DIPHENHYDRAMINE HYDROCHLORIDE 50 MG/ML
50 INJECTION, SOLUTION INTRAMUSCULAR; INTRAVENOUS
Qty: 1 | Refills: 0 | Status: COMPLETED
Start: 2023-11-30

## 2025-01-23 RX ORDER — ACETAMINOPHEN 500 MG/1
500 TABLET ORAL
Qty: 0 | Refills: 0 | Status: COMPLETED
Start: 2023-11-30

## 2025-01-23 RX ORDER — TOCILIZUMAB 20 MG/ML
80 INJECTION, SOLUTION, CONCENTRATE INTRAVENOUS
Qty: 1 | Refills: 0 | Status: COMPLETED
Start: 2025-01-22

## 2025-02-12 DIAGNOSIS — M31.6 OTHER GIANT CELL ARTERITIS: ICD-10-CM

## 2025-02-12 RX ORDER — TOCILIZUMAB 20 MG/ML
80 INJECTION, SOLUTION, CONCENTRATE INTRAVENOUS
Refills: 0 | Status: ACTIVE | OUTPATIENT
Start: 2025-02-12 | End: 1900-01-01

## 2025-02-18 ENCOUNTER — NON-APPOINTMENT (OUTPATIENT)
Age: 80
End: 2025-02-18

## 2025-02-19 ENCOUNTER — APPOINTMENT (OUTPATIENT)
Dept: RHEUMATOLOGY | Facility: CLINIC | Age: 80
End: 2025-02-19
Payer: MEDICARE

## 2025-02-19 ENCOUNTER — MED ADMIN CHARGE (OUTPATIENT)
Age: 80
End: 2025-02-19

## 2025-02-19 VITALS
TEMPERATURE: 97 F | DIASTOLIC BLOOD PRESSURE: 65 MMHG | SYSTOLIC BLOOD PRESSURE: 117 MMHG | HEART RATE: 72 BPM | OXYGEN SATURATION: 97 %

## 2025-02-19 PROCEDURE — 96374 THER/PROPH/DIAG INJ IV PUSH: CPT | Mod: 59

## 2025-02-19 PROCEDURE — 96413 CHEMO IV INFUSION 1 HR: CPT

## 2025-02-19 RX ORDER — TOCILIZUMAB 20 MG/ML
200 INJECTION, SOLUTION, CONCENTRATE INTRAVENOUS
Refills: 0 | Status: ACTIVE | OUTPATIENT
Start: 2025-02-12 | End: 1900-01-01

## 2025-02-19 RX ORDER — TOCILIZUMAB 20 MG/ML
80 INJECTION, SOLUTION, CONCENTRATE INTRAVENOUS
Qty: 1 | Refills: 0 | Status: COMPLETED
Start: 2025-02-12

## 2025-02-19 RX ORDER — ACETAMINOPHEN 500 MG/1
500 TABLET ORAL
Qty: 0 | Refills: 0 | Status: COMPLETED
Start: 2023-11-30

## 2025-02-19 RX ORDER — DIPHENHYDRAMINE HYDROCHLORIDE 50 MG/ML
50 INJECTION, SOLUTION INTRAMUSCULAR; INTRAVENOUS
Qty: 1 | Refills: 0 | Status: COMPLETED
Start: 2023-11-30

## 2025-02-19 RX ORDER — TOCILIZUMAB 20 MG/ML
200 INJECTION, SOLUTION, CONCENTRATE INTRAVENOUS
Qty: 1 | Refills: 0 | Status: COMPLETED
Start: 2025-02-12

## 2025-02-25 ENCOUNTER — RX RENEWAL (OUTPATIENT)
Age: 80
End: 2025-02-25

## 2025-02-27 ENCOUNTER — APPOINTMENT (OUTPATIENT)
Dept: CARDIOLOGY | Facility: CLINIC | Age: 80
End: 2025-02-27

## 2025-03-19 ENCOUNTER — APPOINTMENT (OUTPATIENT)
Dept: RHEUMATOLOGY | Facility: CLINIC | Age: 80
End: 2025-03-19
Payer: MEDICARE

## 2025-03-19 ENCOUNTER — MED ADMIN CHARGE (OUTPATIENT)
Age: 80
End: 2025-03-19

## 2025-03-19 VITALS
HEART RATE: 65 BPM | OXYGEN SATURATION: 96 % | DIASTOLIC BLOOD PRESSURE: 73 MMHG | SYSTOLIC BLOOD PRESSURE: 120 MMHG | TEMPERATURE: 97 F

## 2025-03-19 PROCEDURE — 96413 CHEMO IV INFUSION 1 HR: CPT

## 2025-03-19 RX ORDER — TOCILIZUMAB 20 MG/ML
200 INJECTION, SOLUTION, CONCENTRATE INTRAVENOUS
Qty: 1 | Refills: 0 | Status: COMPLETED
Start: 2025-02-12

## 2025-03-21 DIAGNOSIS — M31.6 OTHER GIANT CELL ARTERITIS: ICD-10-CM

## 2025-03-21 RX ORDER — ACETAMINOPHEN 500 MG/1
500 TABLET ORAL
Refills: 0 | Status: ACTIVE | OUTPATIENT
Start: 2025-03-21

## 2025-03-21 RX ORDER — DIPHENHYDRAMINE HYDROCHLORIDE 50 MG/ML
50 INJECTION, SOLUTION INTRAMUSCULAR; INTRAVENOUS
Refills: 0 | Status: ACTIVE | OUTPATIENT
Start: 2025-03-21

## 2025-04-23 ENCOUNTER — MED ADMIN CHARGE (OUTPATIENT)
Age: 80
End: 2025-04-23

## 2025-04-23 ENCOUNTER — APPOINTMENT (OUTPATIENT)
Dept: RHEUMATOLOGY | Facility: CLINIC | Age: 80
End: 2025-04-23
Payer: MEDICARE

## 2025-04-23 ENCOUNTER — APPOINTMENT (OUTPATIENT)
Dept: RHEUMATOLOGY | Facility: CLINIC | Age: 80
End: 2025-04-23

## 2025-04-23 VITALS
TEMPERATURE: 98 F | HEART RATE: 74 BPM | RESPIRATION RATE: 18 BRPM | SYSTOLIC BLOOD PRESSURE: 147 MMHG | OXYGEN SATURATION: 98 % | DIASTOLIC BLOOD PRESSURE: 82 MMHG

## 2025-04-23 PROCEDURE — 96374 THER/PROPH/DIAG INJ IV PUSH: CPT | Mod: 59

## 2025-04-23 PROCEDURE — 96413 CHEMO IV INFUSION 1 HR: CPT

## 2025-04-23 RX ORDER — ACETAMINOPHEN 500 MG/1
500 TABLET ORAL
Qty: 0 | Refills: 0 | Status: COMPLETED
Start: 2025-03-21

## 2025-04-23 RX ORDER — DIPHENHYDRAMINE HYDROCHLORIDE 50 MG/ML
50 INJECTION, SOLUTION INTRAMUSCULAR; INTRAVENOUS
Qty: 1 | Refills: 0 | Status: COMPLETED
Start: 2025-03-21

## 2025-05-14 ENCOUNTER — RX RENEWAL (OUTPATIENT)
Age: 80
End: 2025-05-14

## 2025-05-15 DIAGNOSIS — Z51.81 ENCOUNTER FOR THERAPEUTIC DRUG LVL MONITORING: ICD-10-CM

## 2025-05-15 DIAGNOSIS — Z79.620 ENCOUNTER FOR THERAPEUTIC DRUG LVL MONITORING: ICD-10-CM

## 2025-05-21 ENCOUNTER — MED ADMIN CHARGE (OUTPATIENT)
Age: 80
End: 2025-05-21

## 2025-05-21 ENCOUNTER — APPOINTMENT (OUTPATIENT)
Dept: RHEUMATOLOGY | Facility: CLINIC | Age: 80
End: 2025-05-21
Payer: MEDICARE

## 2025-05-21 VITALS
RESPIRATION RATE: 17 BRPM | SYSTOLIC BLOOD PRESSURE: 143 MMHG | TEMPERATURE: 97.5 F | HEART RATE: 61 BPM | DIASTOLIC BLOOD PRESSURE: 75 MMHG | OXYGEN SATURATION: 98 %

## 2025-05-21 VITALS
DIASTOLIC BLOOD PRESSURE: 83 MMHG | HEART RATE: 64 BPM | TEMPERATURE: 97.8 F | SYSTOLIC BLOOD PRESSURE: 159 MMHG | OXYGEN SATURATION: 98 %

## 2025-05-21 PROCEDURE — 96374 THER/PROPH/DIAG INJ IV PUSH: CPT | Mod: 59

## 2025-05-21 PROCEDURE — 96413 CHEMO IV INFUSION 1 HR: CPT

## 2025-05-21 RX ORDER — TOCILIZUMAB 20 MG/ML
200 INJECTION, SOLUTION, CONCENTRATE INTRAVENOUS
Qty: 1 | Refills: 0 | Status: COMPLETED
Start: 2025-02-12

## 2025-05-21 RX ORDER — TOCILIZUMAB 20 MG/ML
80 INJECTION, SOLUTION, CONCENTRATE INTRAVENOUS
Qty: 1 | Refills: 0 | Status: COMPLETED
Start: 2025-02-12

## 2025-05-21 RX ORDER — DIPHENHYDRAMINE HYDROCHLORIDE 50 MG/ML
50 INJECTION, SOLUTION INTRAMUSCULAR; INTRAVENOUS
Qty: 1 | Refills: 0 | Status: COMPLETED
Start: 2025-03-21

## 2025-05-21 RX ORDER — ACETAMINOPHEN 500 MG/1
500 TABLET ORAL
Qty: 0 | Refills: 0 | Status: COMPLETED
Start: 2025-03-21

## 2025-06-03 ENCOUNTER — APPOINTMENT (OUTPATIENT)
Dept: ENDOCRINOLOGY | Facility: CLINIC | Age: 80
End: 2025-06-03

## 2025-06-04 ENCOUNTER — APPOINTMENT (OUTPATIENT)
Dept: INTERNAL MEDICINE | Facility: CLINIC | Age: 80
End: 2025-06-04
Payer: MEDICARE

## 2025-06-04 VITALS
SYSTOLIC BLOOD PRESSURE: 130 MMHG | DIASTOLIC BLOOD PRESSURE: 72 MMHG | BODY MASS INDEX: 33.99 KG/M2 | OXYGEN SATURATION: 98 % | HEIGHT: 61 IN | WEIGHT: 180 LBS | HEART RATE: 69 BPM

## 2025-06-04 DIAGNOSIS — I10 ESSENTIAL (PRIMARY) HYPERTENSION: ICD-10-CM

## 2025-06-04 DIAGNOSIS — E78.5 HYPERLIPIDEMIA, UNSPECIFIED: ICD-10-CM

## 2025-06-04 DIAGNOSIS — Z92.241 PERSONAL HISTORY OF SYSTEMIC STEROID THERAPY: ICD-10-CM

## 2025-06-04 DIAGNOSIS — R19.02 LEFT UPPER QUADRANT ABDOMINAL SWELLING, MASS AND LUMP: ICD-10-CM

## 2025-06-04 DIAGNOSIS — E11.9 TYPE 2 DIABETES MELLITUS W/OUT COMPLICATIONS: ICD-10-CM

## 2025-06-04 DIAGNOSIS — N64.89 OTHER SPECIFIED DISORDERS OF BREAST: ICD-10-CM

## 2025-06-04 DIAGNOSIS — Z79.4 TYPE 2 DIABETES MELLITUS W/OUT COMPLICATIONS: ICD-10-CM

## 2025-06-04 DIAGNOSIS — I48.91 UNSPECIFIED ATRIAL FIBRILLATION: ICD-10-CM

## 2025-06-04 DIAGNOSIS — E03.9 HYPOTHYROIDISM, UNSPECIFIED: ICD-10-CM

## 2025-06-04 DIAGNOSIS — Z13.820 ENCOUNTER FOR SCREENING FOR OSTEOPOROSIS: ICD-10-CM

## 2025-06-04 PROCEDURE — 99214 OFFICE O/P EST MOD 30 MIN: CPT

## 2025-06-04 PROCEDURE — G2211 COMPLEX E/M VISIT ADD ON: CPT

## 2025-06-11 ENCOUNTER — APPOINTMENT (OUTPATIENT)
Dept: RHEUMATOLOGY | Facility: CLINIC | Age: 80
End: 2025-06-11
Payer: MEDICARE

## 2025-06-11 VITALS
WEIGHT: 180 LBS | SYSTOLIC BLOOD PRESSURE: 122 MMHG | DIASTOLIC BLOOD PRESSURE: 77 MMHG | HEART RATE: 70 BPM | BODY MASS INDEX: 33.99 KG/M2 | HEIGHT: 61 IN | OXYGEN SATURATION: 96 %

## 2025-06-11 PROCEDURE — 99214 OFFICE O/P EST MOD 30 MIN: CPT

## 2025-06-11 PROCEDURE — G2211 COMPLEX E/M VISIT ADD ON: CPT

## 2025-06-11 RX ADMIN — TOCILIZUMAB 0 MG/10ML: 20 INJECTION, SOLUTION, CONCENTRATE INTRAVENOUS at 00:00

## 2025-06-13 ENCOUNTER — LABORATORY RESULT (OUTPATIENT)
Age: 80
End: 2025-06-13

## 2025-06-24 ENCOUNTER — APPOINTMENT (OUTPATIENT)
Dept: MAMMOGRAPHY | Facility: CLINIC | Age: 80
End: 2025-06-24
Payer: MEDICARE

## 2025-06-24 ENCOUNTER — APPOINTMENT (OUTPATIENT)
Dept: ULTRASOUND IMAGING | Facility: CLINIC | Age: 80
End: 2025-06-24
Payer: MEDICARE

## 2025-06-24 ENCOUNTER — RESULT REVIEW (OUTPATIENT)
Age: 80
End: 2025-06-24

## 2025-06-24 ENCOUNTER — OUTPATIENT (OUTPATIENT)
Dept: OUTPATIENT SERVICES | Facility: HOSPITAL | Age: 80
LOS: 1 days | End: 2025-06-24
Payer: MEDICARE

## 2025-06-24 DIAGNOSIS — Z13.820 ENCOUNTER FOR SCREENING FOR OSTEOPOROSIS: ICD-10-CM

## 2025-06-24 DIAGNOSIS — N64.89 OTHER SPECIFIED DISORDERS OF BREAST: ICD-10-CM

## 2025-06-24 DIAGNOSIS — Z12.39 ENCOUNTER FOR OTHER SCREENING FOR MALIGNANT NEOPLASM OF BREAST: ICD-10-CM

## 2025-06-24 PROCEDURE — 77080 DXA BONE DENSITY AXIAL: CPT | Mod: 26

## 2025-06-24 PROCEDURE — 76641 ULTRASOUND BREAST COMPLETE: CPT | Mod: 26,50,GA

## 2025-06-24 PROCEDURE — 77063 BREAST TOMOSYNTHESIS BI: CPT

## 2025-06-24 PROCEDURE — 76641 ULTRASOUND BREAST COMPLETE: CPT

## 2025-06-24 PROCEDURE — 77067 SCR MAMMO BI INCL CAD: CPT | Mod: 26

## 2025-06-24 PROCEDURE — 77080 DXA BONE DENSITY AXIAL: CPT

## 2025-06-24 PROCEDURE — 77063 BREAST TOMOSYNTHESIS BI: CPT | Mod: 26

## 2025-06-24 PROCEDURE — 77067 SCR MAMMO BI INCL CAD: CPT

## 2025-07-10 RX ORDER — TOCILIZUMAB 20 MG/ML
80 INJECTION, SOLUTION, CONCENTRATE INTRAVENOUS
Qty: 1 | Refills: 0 | Status: ACTIVE | OUTPATIENT
Start: 2025-07-10 | End: 1900-01-01

## 2025-07-16 ENCOUNTER — MED ADMIN CHARGE (OUTPATIENT)
Age: 80
End: 2025-07-16

## 2025-07-16 ENCOUNTER — APPOINTMENT (OUTPATIENT)
Dept: RHEUMATOLOGY | Facility: CLINIC | Age: 80
End: 2025-07-16
Payer: MEDICARE

## 2025-07-16 VITALS
DIASTOLIC BLOOD PRESSURE: 50 MMHG | HEART RATE: 59 BPM | TEMPERATURE: 98.3 F | SYSTOLIC BLOOD PRESSURE: 122 MMHG | OXYGEN SATURATION: 98 % | RESPIRATION RATE: 18 BRPM

## 2025-07-16 PROCEDURE — 96374 THER/PROPH/DIAG INJ IV PUSH: CPT | Mod: 59

## 2025-07-16 PROCEDURE — 96413 CHEMO IV INFUSION 1 HR: CPT

## 2025-07-16 RX ORDER — ACETAMINOPHEN 500 MG/1
500 TABLET ORAL
Qty: 0 | Refills: 0 | Status: COMPLETED
Start: 2025-03-21

## 2025-07-16 RX ORDER — TOCILIZUMAB 20 MG/ML
80 INJECTION, SOLUTION, CONCENTRATE INTRAVENOUS
Qty: 1 | Refills: 0 | Status: COMPLETED
Start: 2025-07-10

## 2025-07-16 RX ORDER — DIPHENHYDRAMINE HYDROCHLORIDE 50 MG/ML
50 INJECTION, SOLUTION INTRAMUSCULAR; INTRAVENOUS
Qty: 1 | Refills: 0 | Status: COMPLETED
Start: 2025-03-21

## 2025-09-02 ENCOUNTER — RX RENEWAL (OUTPATIENT)
Age: 80
End: 2025-09-02

## 2025-09-15 RX ORDER — TOCILIZUMAB 20 MG/ML
400 INJECTION, SOLUTION, CONCENTRATE INTRAVENOUS
Qty: 1 | Refills: 0 | Status: ACTIVE | OUTPATIENT
Start: 2025-09-12 | End: 1900-01-01

## 2025-09-17 ENCOUNTER — MED ADMIN CHARGE (OUTPATIENT)
Age: 80
End: 2025-09-17

## 2025-09-17 ENCOUNTER — APPOINTMENT (OUTPATIENT)
Dept: RHEUMATOLOGY | Facility: CLINIC | Age: 80
End: 2025-09-17
Payer: MEDICARE

## 2025-09-17 VITALS
TEMPERATURE: 96.1 F | OXYGEN SATURATION: 97 % | HEART RATE: 71 BPM | RESPIRATION RATE: 16 BRPM | DIASTOLIC BLOOD PRESSURE: 68 MMHG | SYSTOLIC BLOOD PRESSURE: 138 MMHG

## 2025-09-17 PROCEDURE — 96374 THER/PROPH/DIAG INJ IV PUSH: CPT | Mod: 59

## 2025-09-17 PROCEDURE — 96413 CHEMO IV INFUSION 1 HR: CPT

## 2025-09-17 RX ORDER — ACETAMINOPHEN 500 MG/1
500 TABLET ORAL
Qty: 0 | Refills: 0 | Status: COMPLETED
Start: 2025-03-21

## 2025-09-17 RX ORDER — TOCILIZUMAB 20 MG/ML
400 INJECTION, SOLUTION, CONCENTRATE INTRAVENOUS
Qty: 1 | Refills: 0 | Status: COMPLETED
Start: 2025-09-12

## 2025-09-17 RX ORDER — DIPHENHYDRAMINE HYDROCHLORIDE 50 MG/ML
50 INJECTION, SOLUTION INTRAMUSCULAR; INTRAVENOUS
Qty: 1 | Refills: 0 | Status: COMPLETED
Start: 2025-03-21

## 2025-09-17 RX ORDER — TOCILIZUMAB 20 MG/ML
80 INJECTION, SOLUTION, CONCENTRATE INTRAVENOUS
Qty: 1 | Refills: 0 | Status: COMPLETED
Start: 2025-07-10

## (undated) DEVICE — GLV 7.5 PROTEXIS (WHITE)

## (undated) DEVICE — DRSG STERISTRIPS 0.5 X 4"

## (undated) DEVICE — BLADE SCALPEL SAFETYLOCK #11

## (undated) DEVICE — MEDICATION LABELS W MARKER

## (undated) DEVICE — PACK GENERAL MINOR

## (undated) DEVICE — DRAPE INSTRUMENT POUCH 6.75" X 11"

## (undated) DEVICE — SUCTION YANKAUER NO CONTROL VENT

## (undated) DEVICE — GLV 8 PROTEXIS (WHITE)

## (undated) DEVICE — DRSG OPSITE 13.75 X 4"

## (undated) DEVICE — SUT SOFSILK 4-0 18" TIES

## (undated) DEVICE — WARMING BLANKET LOWER ADULT

## (undated) DEVICE — SOL IRR POUR NS 0.9% 500ML

## (undated) DEVICE — SYR LUER LOK 10CC

## (undated) DEVICE — DRAPE MINOR PROCEDURE

## (undated) DEVICE — SUT BIOSYN 4-0 18" P-12

## (undated) DEVICE — MARKING PEN W RULER

## (undated) DEVICE — FOLEY TRAY 16FR 5CC LTX UMETER CLOSED

## (undated) DEVICE — SOL IRR POUR H2O 250ML

## (undated) DEVICE — GLV 6.5 PROTEXIS (WHITE)

## (undated) DEVICE — SUT POLYSORB 3-0 30" V-20 UNDYED

## (undated) DEVICE — ELCTR BOVIE TIP NEEDLE INSULATED 2.8" EDGE

## (undated) DEVICE — POSITIONER FOAM EGG CRATE ULNAR 2PCS (PINK)

## (undated) DEVICE — GLV 7 PROTEXIS (WHITE)

## (undated) DEVICE — PREP CHLORAPREP HI-LITE ORANGE 26ML

## (undated) DEVICE — SPECIMEN CONTAINER 100ML

## (undated) DEVICE — ELCTR BOVIE TIP NEEDLE 2.84"

## (undated) DEVICE — DRSG MASTISOL

## (undated) DEVICE — VENODYNE/SCD SLEEVE CALF LARGE

## (undated) DEVICE — WARMING BLANKET UPPER ADULT

## (undated) DEVICE — DRAPE TOWEL BLUE 17" X 24"